# Patient Record
Sex: MALE | Race: WHITE | URBAN - METROPOLITAN AREA
[De-identification: names, ages, dates, MRNs, and addresses within clinical notes are randomized per-mention and may not be internally consistent; named-entity substitution may affect disease eponyms.]

---

## 2017-07-12 ENCOUNTER — APPOINTMENT (RX ONLY)
Dept: URBAN - METROPOLITAN AREA CLINIC 86 | Facility: CLINIC | Age: 57
Setting detail: DERMATOLOGY
End: 2017-07-12

## 2017-07-12 VITALS
WEIGHT: 181 LBS | DIASTOLIC BLOOD PRESSURE: 76 MMHG | SYSTOLIC BLOOD PRESSURE: 115 MMHG | HEART RATE: 74 BPM | HEIGHT: 68 IN

## 2017-07-12 DIAGNOSIS — D18.0 HEMANGIOMA: ICD-10-CM

## 2017-07-12 DIAGNOSIS — D22 MELANOCYTIC NEVI: ICD-10-CM

## 2017-07-12 DIAGNOSIS — Z71.89 OTHER SPECIFIED COUNSELING: ICD-10-CM

## 2017-07-12 PROBLEM — D22.9 MELANOCYTIC NEVI, UNSPECIFIED: Status: ACTIVE | Noted: 2017-07-12

## 2017-07-12 PROBLEM — D18.01 HEMANGIOMA OF SKIN AND SUBCUTANEOUS TISSUE: Status: ACTIVE | Noted: 2017-07-12

## 2017-07-12 PROCEDURE — ? COUNSELING

## 2017-07-12 PROCEDURE — 99214 OFFICE O/P EST MOD 30 MIN: CPT

## 2017-07-12 ASSESSMENT — LOCATION DETAILED DESCRIPTION DERM: LOCATION DETAILED: RIGHT VENTRAL PROXIMAL FOREARM

## 2017-07-12 ASSESSMENT — LOCATION SIMPLE DESCRIPTION DERM: LOCATION SIMPLE: RIGHT FOREARM

## 2017-07-12 ASSESSMENT — LOCATION ZONE DERM: LOCATION ZONE: ARM

## 2017-07-12 NOTE — PROCEDURE: MIPS QUALITY
Quality 134: Screening For Clinical Depression And Follow-Up Plan: The patient was screened for depression and the screen was negative and no follow up required
Quality 130: Documentation Of Current Medications In The Medical Record: Current Medications Documented
Quality 394c: Hpv Vaccine For Adolescents: Patient did not have at least three HPV vaccines on or between the patientâs 9th and 13th birthdays.
Quality 402: Tobacco Use And Help With Quitting Among Adolescents: Patient screened for tobacco and never smoked
Quality 154 Part A: Falls: Risk Assessment (Should Be Reported With Measure 155.): Falls risk assessment completed and documented in the past 12 months.
Quality 394b: Td/Tdap Immunizations For Adolescents: Patient did not have one Tdap or one Td vaccine on or between the patient's 10th and 13th birthdays.
Quality 128: Preventive Care And Screening: Body Mass Index (Bmi) Screening And Follow-Up Plan: BMI is documented above normal parameters and a follow-up plan is documented
Quality 111:Pneumonia Vaccination Status For Older Adults: Pneumococcal Vaccination not Administered or Previously Received, Reason not Otherwise Specified
Quality 154 Part B: Falls: Risk Screening (Should Be Reported With Measure 155.): No documentation of falls status
Quality 317: Preventative Care And Screening: Screening For High Blood Pressure And Follow-Up Documented: Pre-hypertensive or hypertensive blood pressure reading documented, and the indicated follow-up is documented
Quality 431: Preventive Care And Screening: Unhealthy Alcohol Use - Screening: Patient screened for unhealthy alcohol use using a single question and scores less than 2 times per year
Quality 394a: Meningococcal Immunizations For Adolescents: Patient did not have one dose of meningococcal vaccine on or between the patient's 11th and 13th birthdays.
Quality 47: Advance Care Plan: Advance Care Planning discussed and documented; advance care plan or surrogate decision maker documented in the medical record.
Quality 155: Falls Plan Of Care: Falls plan of care documented (including vitamin D supplementation)
Quality 110: Preventive Care And Screening: Influenza Immunization: Influenza Immunization Administered during Influenza season
Quality 131: Pain Assessment And Follow-Up: Pain assessment using a standardized tool is documented as negative, no follow-up plan required
Detail Level: Detailed

## 2020-09-21 ENCOUNTER — HOSPITAL ENCOUNTER (OUTPATIENT)
Dept: MRI IMAGING | Age: 60
Discharge: HOME | End: 2020-09-21
Attending: NEUROLOGICAL SURGERY
Payer: COMMERCIAL

## 2020-09-21 DIAGNOSIS — M51.26 HNP (HERNIATED NUCLEUS PULPOSUS), LUMBAR: ICD-10-CM

## 2020-09-21 PROCEDURE — 72148 MRI LUMBAR SPINE W/O DYE: CPT

## 2020-12-01 NOTE — PERIOP NOTES
SPOKE WITH PATIENT; HE WILL HAVE COVID TEST DONE AT [facility] ON 12/11/20 AND FAX RESULTS TO PAT & SURGERY DEPARTMENT. FAX NUMBERS PROVIDED. PT. UNDERSTANDS IT IS HIS RESPONSIBILITY TO GET RESULTS TO US BY 12/14/20 OR SURGERY WILL BE CANCELED. HE PLANS ON BRINGING HARD COPY OF RESULTS DOS. HE UNDERSTANDS THE NEED TO SELF QUARANTINE FROM TIME OF TEST UNTIL DOS AND WILL COMPLY.

## 2020-12-07 ENCOUNTER — HOSPITAL ENCOUNTER (OUTPATIENT)
Dept: PREADMISSION TESTING | Age: 60
Discharge: HOME | End: 2020-12-07
Payer: COMMERCIAL

## 2020-12-07 VITALS
WEIGHT: 176.81 LBS | HEIGHT: 69 IN | SYSTOLIC BLOOD PRESSURE: 127 MMHG | HEART RATE: 65 BPM | BODY MASS INDEX: 26.19 KG/M2 | RESPIRATION RATE: 18 BRPM | DIASTOLIC BLOOD PRESSURE: 80 MMHG | TEMPERATURE: 98.5 F

## 2020-12-07 LAB
BASOPHILS # BLD: 0 K/UL (ref 0–0.1)
BASOPHILS NFR BLD: 0 % (ref 0–1)
DIFFERENTIAL METHOD BLD: ABNORMAL
EOSINOPHIL # BLD: 0 K/UL (ref 0–0.4)
EOSINOPHIL NFR BLD: 0 % (ref 0–7)
ERYTHROCYTE [DISTWIDTH] IN BLOOD BY AUTOMATED COUNT: 12.9 % (ref 11.5–14.5)
HCT VFR BLD AUTO: 43.6 % (ref 36.6–50.3)
HGB BLD-MCNC: 14.6 G/DL (ref 12.1–17)
IMM GRANULOCYTES # BLD AUTO: 0.1 K/UL (ref 0–0.04)
IMM GRANULOCYTES NFR BLD AUTO: 1 % (ref 0–0.5)
LYMPHOCYTES # BLD: 1.4 K/UL (ref 0.8–3.5)
LYMPHOCYTES NFR BLD: 16 % (ref 12–49)
MCH RBC QN AUTO: 31.5 PG (ref 26–34)
MCHC RBC AUTO-ENTMCNC: 33.5 G/DL (ref 30–36.5)
MCV RBC AUTO: 94 FL (ref 80–99)
MONOCYTES # BLD: 1 K/UL (ref 0–1)
MONOCYTES NFR BLD: 11 % (ref 5–13)
NEUTS SEG # BLD: 6.3 K/UL (ref 1.8–8)
NEUTS SEG NFR BLD: 72 % (ref 32–75)
NRBC # BLD: 0 K/UL (ref 0–0.01)
NRBC BLD-RTO: 0 PER 100 WBC
PLATELET # BLD AUTO: 208 K/UL (ref 150–400)
PMV BLD AUTO: 11 FL (ref 8.9–12.9)
RBC # BLD AUTO: 4.64 M/UL (ref 4.1–5.7)
WBC # BLD AUTO: 8.8 K/UL (ref 4.1–11.1)

## 2020-12-07 PROCEDURE — 85025 COMPLETE CBC W/AUTO DIFF WBC: CPT

## 2020-12-07 PROCEDURE — 36415 COLL VENOUS BLD VENIPUNCTURE: CPT

## 2020-12-07 RX ORDER — GABAPENTIN 300 MG/1
300 CAPSULE ORAL 4 TIMES DAILY
Status: ON HOLD | COMMUNITY
End: 2020-12-16 | Stop reason: SDUPTHER

## 2020-12-07 NOTE — PERIOP NOTES
PRE-OPERATIVE INSTRUCTIONS REVIEWED WITH PATIENT. PT GIVEN TIME TO ASK QUESTIONS   PATIENT GIVEN 2-BOTTLE OF CHG SOAP. REVIEWED   PATIENT GIVEN SSI INFECTION FAQ SHEET.   MRSA / MSSA TREATMENT INSTRUCTION SHEET GIVEN      INSTRUCTIONS GIVEN FOR NEW ADMISSION PROCESS    PT STATES IS [address], UNDERSTANDS THAT RESULTS MUST BE FAXED TO OUR OFFICE ALSO TO SURGERY AND WAS INSTRUCTED TO MAKE SURE HE BRINGS HARD COPY TO [address] [place] [address] OF SURGERY

## 2020-12-08 LAB
BACTERIA SPEC CULT: NORMAL
BACTERIA SPEC CULT: NORMAL
SERVICE CMNT-IMP: NORMAL

## 2020-12-15 ENCOUNTER — APPOINTMENT (OUTPATIENT)
Dept: GENERAL RADIOLOGY | Age: 60
End: 2020-12-15
Attending: NEUROLOGICAL SURGERY
Payer: COMMERCIAL

## 2020-12-15 ENCOUNTER — ANESTHESIA (OUTPATIENT)
Dept: SURGERY | Age: 60
End: 2020-12-15
Payer: COMMERCIAL

## 2020-12-15 ENCOUNTER — ANESTHESIA EVENT (OUTPATIENT)
Dept: SURGERY | Age: 60
End: 2020-12-15
Payer: COMMERCIAL

## 2020-12-15 ENCOUNTER — HOSPITAL ENCOUNTER (OUTPATIENT)
Age: 60
Discharge: HOME | End: 2020-12-16
Attending: NEUROLOGICAL SURGERY | Admitting: NEUROLOGICAL SURGERY
Payer: COMMERCIAL

## 2020-12-15 DIAGNOSIS — Z98.1 S/P LUMBAR SPINAL FUSION: Primary | ICD-10-CM

## 2020-12-15 DIAGNOSIS — M48.062 LUMBAR STENOSIS WITH NEUROGENIC CLAUDICATION: ICD-10-CM

## 2020-12-15 PROCEDURE — 2709999900 HC NON-CHARGEABLE SUPPLY: Performed by: NEUROLOGICAL SURGERY

## 2020-12-15 PROCEDURE — 77030012035 HC APPL SEAL MICROMYST KT INLC -C: Performed by: NEUROLOGICAL SURGERY

## 2020-12-15 PROCEDURE — 76010000172 HC OR TIME 2.5 TO 3 HR INTENSV-TIER 1: Performed by: NEUROLOGICAL SURGERY

## 2020-12-15 PROCEDURE — 99218 HC RM OBSERVATION: CPT

## 2020-12-15 PROCEDURE — 76060000036 HC ANESTHESIA 2.5 TO 3 HR: Performed by: NEUROLOGICAL SURGERY

## 2020-12-15 PROCEDURE — 74011250637 HC RX REV CODE- 250/637: Performed by: NEUROLOGICAL SURGERY

## 2020-12-15 PROCEDURE — 74011250637 HC RX REV CODE- 250/637: Performed by: ANESTHESIOLOGY

## 2020-12-15 PROCEDURE — 76210000017 HC OR PH I REC 1.5 TO 2 HR: Performed by: NEUROLOGICAL SURGERY

## 2020-12-15 PROCEDURE — 74011250636 HC RX REV CODE- 250/636: Performed by: NURSE ANESTHETIST, CERTIFIED REGISTERED

## 2020-12-15 PROCEDURE — 77030029099 HC BN WAX SSPC -A: Performed by: NEUROLOGICAL SURGERY

## 2020-12-15 PROCEDURE — 77030008684 HC TU ET CUF COVD -B: Performed by: NURSE ANESTHETIST, CERTIFIED REGISTERED

## 2020-12-15 PROCEDURE — 77030013951 HC SEAL TISS ADH DURASL KT INLC -G1: Performed by: NEUROLOGICAL SURGERY

## 2020-12-15 PROCEDURE — 74011250636 HC RX REV CODE- 250/636: Performed by: NEUROLOGICAL SURGERY

## 2020-12-15 PROCEDURE — 77030038600 HC TU BPLR IRR DISP STRY -B: Performed by: NEUROLOGICAL SURGERY

## 2020-12-15 PROCEDURE — 77030014007 HC SPNG HEMSTAT J&J -B: Performed by: NEUROLOGICAL SURGERY

## 2020-12-15 PROCEDURE — 77030026438 HC STYL ET INTUB CARD -A: Performed by: NURSE ANESTHETIST, CERTIFIED REGISTERED

## 2020-12-15 PROCEDURE — 77030040922 HC BLNKT HYPOTHRM STRY -A

## 2020-12-15 PROCEDURE — 77030003029 HC SUT VCRL J&J -B: Performed by: NEUROLOGICAL SURGERY

## 2020-12-15 PROCEDURE — 77030004391 HC BUR FLUT MEDT -C: Performed by: NEUROLOGICAL SURGERY

## 2020-12-15 PROCEDURE — 74011000250 HC RX REV CODE- 250: Performed by: NEUROLOGICAL SURGERY

## 2020-12-15 PROCEDURE — 77030014647 HC SEAL FBRN TISSL BAXT -D: Performed by: NEUROLOGICAL SURGERY

## 2020-12-15 PROCEDURE — 74011250636 HC RX REV CODE- 250/636: Performed by: ANESTHESIOLOGY

## 2020-12-15 PROCEDURE — 74011000250 HC RX REV CODE- 250: Performed by: NURSE ANESTHETIST, CERTIFIED REGISTERED

## 2020-12-15 RX ORDER — ACETAMINOPHEN 325 MG/1
650 TABLET ORAL ONCE
Status: DISCONTINUED | OUTPATIENT
Start: 2020-12-15 | End: 2020-12-15

## 2020-12-15 RX ORDER — OXYCODONE HYDROCHLORIDE 5 MG/1
5 TABLET ORAL
Status: DISCONTINUED | OUTPATIENT
Start: 2020-12-15 | End: 2020-12-16 | Stop reason: HOSPADM

## 2020-12-15 RX ORDER — SODIUM CHLORIDE 9 MG/ML
25 INJECTION, SOLUTION INTRAVENOUS CONTINUOUS
Status: DISCONTINUED | OUTPATIENT
Start: 2020-12-15 | End: 2020-12-15 | Stop reason: HOSPADM

## 2020-12-15 RX ORDER — DIPHENHYDRAMINE HYDROCHLORIDE 50 MG/ML
12.5 INJECTION, SOLUTION INTRAMUSCULAR; INTRAVENOUS AS NEEDED
Status: DISCONTINUED | OUTPATIENT
Start: 2020-12-15 | End: 2020-12-15 | Stop reason: HOSPADM

## 2020-12-15 RX ORDER — DEXAMETHASONE SODIUM PHOSPHATE 4 MG/ML
INJECTION, SOLUTION INTRA-ARTICULAR; INTRALESIONAL; INTRAMUSCULAR; INTRAVENOUS; SOFT TISSUE AS NEEDED
Status: DISCONTINUED | OUTPATIENT
Start: 2020-12-15 | End: 2020-12-15 | Stop reason: HOSPADM

## 2020-12-15 RX ORDER — LIDOCAINE HYDROCHLORIDE 20 MG/ML
INJECTION, SOLUTION EPIDURAL; INFILTRATION; INTRACAUDAL; PERINEURAL AS NEEDED
Status: DISCONTINUED | OUTPATIENT
Start: 2020-12-15 | End: 2020-12-15 | Stop reason: HOSPADM

## 2020-12-15 RX ORDER — CYCLOBENZAPRINE HCL 10 MG
10 TABLET ORAL
Status: DISCONTINUED | OUTPATIENT
Start: 2020-12-15 | End: 2020-12-16 | Stop reason: HOSPADM

## 2020-12-15 RX ORDER — BUPIVACAINE HYDROCHLORIDE 5 MG/ML
INJECTION, SOLUTION EPIDURAL; INTRACAUDAL AS NEEDED
Status: DISCONTINUED | OUTPATIENT
Start: 2020-12-15 | End: 2020-12-15 | Stop reason: HOSPADM

## 2020-12-15 RX ORDER — OXYCODONE HYDROCHLORIDE 5 MG/1
10 TABLET ORAL
Status: DISCONTINUED | OUTPATIENT
Start: 2020-12-15 | End: 2020-12-16 | Stop reason: HOSPADM

## 2020-12-15 RX ORDER — FENTANYL CITRATE 50 UG/ML
50 INJECTION, SOLUTION INTRAMUSCULAR; INTRAVENOUS AS NEEDED
Status: DISCONTINUED | OUTPATIENT
Start: 2020-12-15 | End: 2020-12-15 | Stop reason: HOSPADM

## 2020-12-15 RX ORDER — DOCUSATE SODIUM 100 MG/1
100 CAPSULE, LIQUID FILLED ORAL 2 TIMES DAILY
Status: DISCONTINUED | OUTPATIENT
Start: 2020-12-15 | End: 2020-12-16 | Stop reason: HOSPADM

## 2020-12-15 RX ORDER — SODIUM CHLORIDE 0.9 % (FLUSH) 0.9 %
5-40 SYRINGE (ML) INJECTION AS NEEDED
Status: DISCONTINUED | OUTPATIENT
Start: 2020-12-15 | End: 2020-12-15 | Stop reason: HOSPADM

## 2020-12-15 RX ORDER — SODIUM CHLORIDE, SODIUM LACTATE, POTASSIUM CHLORIDE, CALCIUM CHLORIDE 600; 310; 30; 20 MG/100ML; MG/100ML; MG/100ML; MG/100ML
125 INJECTION, SOLUTION INTRAVENOUS CONTINUOUS
Status: DISCONTINUED | OUTPATIENT
Start: 2020-12-15 | End: 2020-12-15 | Stop reason: HOSPADM

## 2020-12-15 RX ORDER — MIDAZOLAM HYDROCHLORIDE 1 MG/ML
0.5 INJECTION, SOLUTION INTRAMUSCULAR; INTRAVENOUS
Status: DISCONTINUED | OUTPATIENT
Start: 2020-12-15 | End: 2020-12-15 | Stop reason: HOSPADM

## 2020-12-15 RX ORDER — SODIUM CHLORIDE 0.9 % (FLUSH) 0.9 %
5-40 SYRINGE (ML) INJECTION EVERY 8 HOURS
Status: DISCONTINUED | OUTPATIENT
Start: 2020-12-15 | End: 2020-12-15 | Stop reason: HOSPADM

## 2020-12-15 RX ORDER — MIDAZOLAM HYDROCHLORIDE 1 MG/ML
1 INJECTION, SOLUTION INTRAMUSCULAR; INTRAVENOUS AS NEEDED
Status: DISCONTINUED | OUTPATIENT
Start: 2020-12-15 | End: 2020-12-15 | Stop reason: HOSPADM

## 2020-12-15 RX ORDER — SODIUM CHLORIDE 9 MG/ML
125 INJECTION, SOLUTION INTRAVENOUS CONTINUOUS
Status: DISCONTINUED | OUTPATIENT
Start: 2020-12-15 | End: 2020-12-16

## 2020-12-15 RX ORDER — HYDROMORPHONE HYDROCHLORIDE 1 MG/ML
0.2 INJECTION, SOLUTION INTRAMUSCULAR; INTRAVENOUS; SUBCUTANEOUS
Status: DISCONTINUED | OUTPATIENT
Start: 2020-12-15 | End: 2020-12-15 | Stop reason: HOSPADM

## 2020-12-15 RX ORDER — HYDROMORPHONE HYDROCHLORIDE 2 MG/ML
INJECTION, SOLUTION INTRAMUSCULAR; INTRAVENOUS; SUBCUTANEOUS AS NEEDED
Status: DISCONTINUED | OUTPATIENT
Start: 2020-12-15 | End: 2020-12-15 | Stop reason: HOSPADM

## 2020-12-15 RX ORDER — GABAPENTIN 300 MG/1
300 CAPSULE ORAL 4 TIMES DAILY
Status: DISCONTINUED | OUTPATIENT
Start: 2020-12-15 | End: 2020-12-16 | Stop reason: HOSPADM

## 2020-12-15 RX ORDER — SODIUM CHLORIDE 0.9 % (FLUSH) 0.9 %
5-40 SYRINGE (ML) INJECTION EVERY 8 HOURS
Status: DISCONTINUED | OUTPATIENT
Start: 2020-12-15 | End: 2020-12-16 | Stop reason: HOSPADM

## 2020-12-15 RX ORDER — TRAMADOL HYDROCHLORIDE 50 MG/1
50 TABLET ORAL
Status: DISCONTINUED | OUTPATIENT
Start: 2020-12-15 | End: 2020-12-16 | Stop reason: HOSPADM

## 2020-12-15 RX ORDER — ACETAMINOPHEN 325 MG/1
650 TABLET ORAL EVERY 6 HOURS
Status: DISCONTINUED | OUTPATIENT
Start: 2020-12-15 | End: 2020-12-16 | Stop reason: HOSPADM

## 2020-12-15 RX ORDER — ONDANSETRON 2 MG/ML
4 INJECTION INTRAMUSCULAR; INTRAVENOUS AS NEEDED
Status: DISCONTINUED | OUTPATIENT
Start: 2020-12-15 | End: 2020-12-15 | Stop reason: HOSPADM

## 2020-12-15 RX ORDER — MORPHINE SULFATE 10 MG/ML
2 INJECTION, SOLUTION INTRAMUSCULAR; INTRAVENOUS
Status: DISCONTINUED | OUTPATIENT
Start: 2020-12-15 | End: 2020-12-15 | Stop reason: HOSPADM

## 2020-12-15 RX ORDER — PROPOFOL 10 MG/ML
INJECTION, EMULSION INTRAVENOUS AS NEEDED
Status: DISCONTINUED | OUTPATIENT
Start: 2020-12-15 | End: 2020-12-15 | Stop reason: HOSPADM

## 2020-12-15 RX ORDER — LORATADINE 10 MG/1
10 TABLET ORAL DAILY
Status: DISCONTINUED | OUTPATIENT
Start: 2020-12-16 | End: 2020-12-16 | Stop reason: HOSPADM

## 2020-12-15 RX ORDER — ONDANSETRON 2 MG/ML
INJECTION INTRAMUSCULAR; INTRAVENOUS AS NEEDED
Status: DISCONTINUED | OUTPATIENT
Start: 2020-12-15 | End: 2020-12-15 | Stop reason: HOSPADM

## 2020-12-15 RX ORDER — MIDAZOLAM HYDROCHLORIDE 1 MG/ML
INJECTION, SOLUTION INTRAMUSCULAR; INTRAVENOUS AS NEEDED
Status: DISCONTINUED | OUTPATIENT
Start: 2020-12-15 | End: 2020-12-15 | Stop reason: HOSPADM

## 2020-12-15 RX ORDER — DIPHENHYDRAMINE HCL 25 MG
25 CAPSULE ORAL
Status: DISCONTINUED | OUTPATIENT
Start: 2020-12-15 | End: 2020-12-16 | Stop reason: HOSPADM

## 2020-12-15 RX ORDER — ROCURONIUM BROMIDE 10 MG/ML
INJECTION, SOLUTION INTRAVENOUS AS NEEDED
Status: DISCONTINUED | OUTPATIENT
Start: 2020-12-15 | End: 2020-12-15 | Stop reason: HOSPADM

## 2020-12-15 RX ORDER — ONDANSETRON 4 MG/1
4 TABLET, ORALLY DISINTEGRATING ORAL
Status: DISCONTINUED | OUTPATIENT
Start: 2020-12-15 | End: 2020-12-16 | Stop reason: HOSPADM

## 2020-12-15 RX ORDER — ACETAMINOPHEN 500 MG
1000 TABLET ORAL ONCE
Status: COMPLETED | OUTPATIENT
Start: 2020-12-15 | End: 2020-12-15

## 2020-12-15 RX ORDER — KETAMINE HYDROCHLORIDE 10 MG/ML
INJECTION, SOLUTION INTRAMUSCULAR; INTRAVENOUS AS NEEDED
Status: DISCONTINUED | OUTPATIENT
Start: 2020-12-15 | End: 2020-12-15 | Stop reason: HOSPADM

## 2020-12-15 RX ORDER — PHENYLEPHRINE HCL IN 0.9% NACL 0.4MG/10ML
SYRINGE (ML) INTRAVENOUS AS NEEDED
Status: DISCONTINUED | OUTPATIENT
Start: 2020-12-15 | End: 2020-12-15 | Stop reason: HOSPADM

## 2020-12-15 RX ORDER — HYDROMORPHONE HYDROCHLORIDE 1 MG/ML
0.5 INJECTION, SOLUTION INTRAMUSCULAR; INTRAVENOUS; SUBCUTANEOUS
Status: DISCONTINUED | OUTPATIENT
Start: 2020-12-15 | End: 2020-12-16 | Stop reason: HOSPADM

## 2020-12-15 RX ORDER — MELATONIN
1000 DAILY
Status: DISCONTINUED | OUTPATIENT
Start: 2020-12-16 | End: 2020-12-16 | Stop reason: HOSPADM

## 2020-12-15 RX ORDER — NALOXONE HYDROCHLORIDE 0.4 MG/ML
0.4 INJECTION, SOLUTION INTRAMUSCULAR; INTRAVENOUS; SUBCUTANEOUS AS NEEDED
Status: DISCONTINUED | OUTPATIENT
Start: 2020-12-15 | End: 2020-12-16 | Stop reason: HOSPADM

## 2020-12-15 RX ORDER — SODIUM CHLORIDE, SODIUM LACTATE, POTASSIUM CHLORIDE, CALCIUM CHLORIDE 600; 310; 30; 20 MG/100ML; MG/100ML; MG/100ML; MG/100ML
75 INJECTION, SOLUTION INTRAVENOUS CONTINUOUS
Status: DISCONTINUED | OUTPATIENT
Start: 2020-12-15 | End: 2020-12-15 | Stop reason: HOSPADM

## 2020-12-15 RX ORDER — ROPIVACAINE HYDROCHLORIDE 5 MG/ML
30 INJECTION, SOLUTION EPIDURAL; INFILTRATION; PERINEURAL ONCE
Status: DISCONTINUED | OUTPATIENT
Start: 2020-12-15 | End: 2020-12-15 | Stop reason: HOSPADM

## 2020-12-15 RX ORDER — FENTANYL CITRATE 50 UG/ML
25 INJECTION, SOLUTION INTRAMUSCULAR; INTRAVENOUS
Status: COMPLETED | OUTPATIENT
Start: 2020-12-15 | End: 2020-12-15

## 2020-12-15 RX ORDER — SUCCINYLCHOLINE CHLORIDE 20 MG/ML
INJECTION INTRAMUSCULAR; INTRAVENOUS AS NEEDED
Status: DISCONTINUED | OUTPATIENT
Start: 2020-12-15 | End: 2020-12-15 | Stop reason: HOSPADM

## 2020-12-15 RX ORDER — FENTANYL CITRATE 50 UG/ML
INJECTION, SOLUTION INTRAMUSCULAR; INTRAVENOUS AS NEEDED
Status: DISCONTINUED | OUTPATIENT
Start: 2020-12-15 | End: 2020-12-15 | Stop reason: HOSPADM

## 2020-12-15 RX ORDER — SODIUM CHLORIDE, SODIUM LACTATE, POTASSIUM CHLORIDE, CALCIUM CHLORIDE 600; 310; 30; 20 MG/100ML; MG/100ML; MG/100ML; MG/100ML
INJECTION, SOLUTION INTRAVENOUS
Status: DISCONTINUED | OUTPATIENT
Start: 2020-12-15 | End: 2020-12-15 | Stop reason: HOSPADM

## 2020-12-15 RX ORDER — DEXAMETHASONE SODIUM PHOSPHATE 4 MG/ML
4 INJECTION, SOLUTION INTRA-ARTICULAR; INTRALESIONAL; INTRAMUSCULAR; INTRAVENOUS; SOFT TISSUE EVERY 8 HOURS
Status: COMPLETED | OUTPATIENT
Start: 2020-12-15 | End: 2020-12-16

## 2020-12-15 RX ORDER — LIDOCAINE HYDROCHLORIDE 10 MG/ML
0.1 INJECTION, SOLUTION EPIDURAL; INFILTRATION; INTRACAUDAL; PERINEURAL AS NEEDED
Status: DISCONTINUED | OUTPATIENT
Start: 2020-12-15 | End: 2020-12-15 | Stop reason: HOSPADM

## 2020-12-15 RX ORDER — SODIUM CHLORIDE 0.9 % (FLUSH) 0.9 %
5-40 SYRINGE (ML) INJECTION AS NEEDED
Status: DISCONTINUED | OUTPATIENT
Start: 2020-12-15 | End: 2020-12-16 | Stop reason: HOSPADM

## 2020-12-15 RX ADMIN — MORPHINE SULFATE 2 MG: 10 INJECTION INTRAVENOUS at 15:22

## 2020-12-15 RX ADMIN — FENTANYL CITRATE 25 MCG: 50 INJECTION, SOLUTION INTRAMUSCULAR; INTRAVENOUS at 14:45

## 2020-12-15 RX ADMIN — PROPOFOL 200 MG: 10 INJECTION, EMULSION INTRAVENOUS at 11:02

## 2020-12-15 RX ADMIN — OXYCODONE 10 MG: 5 TABLET ORAL at 16:15

## 2020-12-15 RX ADMIN — SODIUM CHLORIDE, POTASSIUM CHLORIDE, SODIUM LACTATE AND CALCIUM CHLORIDE: 600; 310; 30; 20 INJECTION, SOLUTION INTRAVENOUS at 10:51

## 2020-12-15 RX ADMIN — MIDAZOLAM 0.5 MG: 1 INJECTION INTRAMUSCULAR; INTRAVENOUS at 14:24

## 2020-12-15 RX ADMIN — CEFAZOLIN SODIUM 2 G: 1 INJECTION, POWDER, FOR SOLUTION INTRAMUSCULAR; INTRAVENOUS at 19:04

## 2020-12-15 RX ADMIN — SODIUM CHLORIDE, POTASSIUM CHLORIDE, SODIUM LACTATE AND CALCIUM CHLORIDE 125 ML/HR: 600; 310; 30; 20 INJECTION, SOLUTION INTRAVENOUS at 09:58

## 2020-12-15 RX ADMIN — ROCURONIUM BROMIDE 5 MG: 10 SOLUTION INTRAVENOUS at 11:02

## 2020-12-15 RX ADMIN — MORPHINE SULFATE 2 MG: 10 INJECTION INTRAVENOUS at 15:11

## 2020-12-15 RX ADMIN — ACETAMINOPHEN 1000 MG: 500 TABLET ORAL at 09:51

## 2020-12-15 RX ADMIN — MIDAZOLAM 0.5 MG: 1 INJECTION INTRAMUSCULAR; INTRAVENOUS at 14:15

## 2020-12-15 RX ADMIN — FENTANYL CITRATE 50 MCG: 50 INJECTION, SOLUTION INTRAMUSCULAR; INTRAVENOUS at 11:02

## 2020-12-15 RX ADMIN — ROCURONIUM BROMIDE 10 MG: 10 SOLUTION INTRAVENOUS at 11:21

## 2020-12-15 RX ADMIN — MORPHINE SULFATE 2 MG: 10 INJECTION INTRAVENOUS at 15:00

## 2020-12-15 RX ADMIN — SODIUM CHLORIDE 125 ML/HR: 900 INJECTION, SOLUTION INTRAVENOUS at 14:39

## 2020-12-15 RX ADMIN — ROCURONIUM BROMIDE 10 MG: 10 SOLUTION INTRAVENOUS at 12:16

## 2020-12-15 RX ADMIN — ROCURONIUM BROMIDE 45 MG: 10 SOLUTION INTRAVENOUS at 11:14

## 2020-12-15 RX ADMIN — MIDAZOLAM 0.5 MG: 1 INJECTION INTRAMUSCULAR; INTRAVENOUS at 14:02

## 2020-12-15 RX ADMIN — OXYCODONE 10 MG: 5 TABLET ORAL at 19:14

## 2020-12-15 RX ADMIN — LIDOCAINE HYDROCHLORIDE 80 MG: 20 INJECTION, SOLUTION EPIDURAL; INFILTRATION; INTRACAUDAL; PERINEURAL at 11:02

## 2020-12-15 RX ADMIN — FENTANYL CITRATE 25 MCG: 50 INJECTION, SOLUTION INTRAMUSCULAR; INTRAVENOUS at 14:23

## 2020-12-15 RX ADMIN — ACETAMINOPHEN 650 MG: 325 TABLET ORAL at 18:03

## 2020-12-15 RX ADMIN — KETAMINE HYDROCHLORIDE 30 MG: 10 INJECTION, SOLUTION INTRAMUSCULAR; INTRAVENOUS at 11:12

## 2020-12-15 RX ADMIN — FENTANYL CITRATE 25 MCG: 50 INJECTION, SOLUTION INTRAMUSCULAR; INTRAVENOUS at 14:14

## 2020-12-15 RX ADMIN — ROCURONIUM BROMIDE 30 MG: 10 SOLUTION INTRAVENOUS at 12:05

## 2020-12-15 RX ADMIN — WATER 2 G: 1 INJECTION INTRAMUSCULAR; INTRAVENOUS; SUBCUTANEOUS at 11:10

## 2020-12-15 RX ADMIN — Medication 40 MCG: at 12:31

## 2020-12-15 RX ADMIN — HYDROMORPHONE HYDROCHLORIDE 0.5 MG: 2 INJECTION, SOLUTION INTRAMUSCULAR; INTRAVENOUS; SUBCUTANEOUS at 11:21

## 2020-12-15 RX ADMIN — ROCURONIUM BROMIDE 10 MG: 10 SOLUTION INTRAVENOUS at 11:30

## 2020-12-15 RX ADMIN — ONDANSETRON HYDROCHLORIDE 4 MG: 2 INJECTION, SOLUTION INTRAMUSCULAR; INTRAVENOUS at 11:15

## 2020-12-15 RX ADMIN — SUCCINYLCHOLINE CHLORIDE 160 MG: 20 INJECTION, SOLUTION INTRAMUSCULAR; INTRAVENOUS at 11:02

## 2020-12-15 RX ADMIN — PROPOFOL 100 MG: 10 INJECTION, EMULSION INTRAVENOUS at 11:20

## 2020-12-15 RX ADMIN — DOCUSATE SODIUM 100 MG: 100 CAPSULE ORAL at 18:03

## 2020-12-15 RX ADMIN — DEXAMETHASONE SODIUM PHOSPHATE 8 MG: 4 INJECTION, SOLUTION INTRAMUSCULAR; INTRAVENOUS at 11:15

## 2020-12-15 RX ADMIN — GABAPENTIN 300 MG: 300 CAPSULE ORAL at 18:03

## 2020-12-15 RX ADMIN — DEXAMETHASONE SODIUM PHOSPHATE 4 MG: 4 INJECTION, SOLUTION INTRAMUSCULAR; INTRAVENOUS at 19:03

## 2020-12-15 RX ADMIN — FENTANYL CITRATE 25 MCG: 50 INJECTION, SOLUTION INTRAMUSCULAR; INTRAVENOUS at 14:02

## 2020-12-15 RX ADMIN — FENTANYL CITRATE 50 MCG: 50 INJECTION, SOLUTION INTRAMUSCULAR; INTRAVENOUS at 11:12

## 2020-12-15 RX ADMIN — MIDAZOLAM 2 MG: 1 INJECTION INTRAMUSCULAR; INTRAVENOUS at 10:56

## 2020-12-15 RX ADMIN — OXYCODONE 10 MG: 5 TABLET ORAL at 22:33

## 2020-12-15 RX ADMIN — GABAPENTIN 300 MG: 300 CAPSULE ORAL at 22:33

## 2020-12-15 NOTE — ROUTINE PROCESS
Patient: Brigido Laser MRN: [id number]  SSN: [id number]   YOB: 1960  Age: 63 y.o. Sex: male     Patient is status post Procedure(s):  L2-L5 LAMINECTOMY. Surgeon(s) and Role:     Ruth Esparza MD - Primary    Local/Dose/Irrigation: 20 mL 0.5% marcaine plain                   Peripheral IV 12/15/20 Anterior;Left;Proximal Forearm (Active)   Site Assessment Clean, dry, & intact 12/15/20 0957   Phlebitis Assessment 0 12/15/20 0957   Infiltration Assessment 0 12/15/20 0957   Dressing Status Clean, dry, & intact; New 12/15/20 0957   Dressing Type Tape;Transparent 12/15/20 0957   Hub Color/Line Status Green; Infusing 12/15/20 0957          Hemovac (Active)      Airway - Endotracheal Tube 12/15/20 Oral (Active)                   Dressing/Packing:  Wound Back-Dressing/Treatment: Liquid /adhesive;Steri-strips;Gauze dressing/dressing sponge (12/15/20 1307)    Splint/Cast:  ]

## 2020-12-15 NOTE — PERIOP NOTES
TRANSFER - OUT REPORT:    Verbal report given to Kianna MARQUEZ) on Guilherme Ruelas  being transferred to room 540 for routine post - op       Report consisted of patients Situation, Background, Assessment and   Recommendations(SBAR). Time Pre op antibiotic given:2 gram ancef  Anesthesia Stop time: 5966  Pedroza Present on Transfer to floor:  Order for Pedroza on Chart:  Discharge Prescriptions with Chart:    Information from the following report(s) SBAR, OR Summary, Intake/Output and MAR was reviewed with the receiving nurse. Opportunity for questions and clarification was provided. Is the patient on 02? YES       L/Min 2       Other     Is the patient on a monitor? NO    Is the nurse transporting with the patient? NO    Surgical Waiting Area notified of patient's transfer from PACU? YES      The following personal items collected during your admission accompanied patient upon transfer:   Dental Appliance:    Vision:    Hearing Aid:    Jewelry: Jewelry: None  Clothing: Clothing: (clothing to PACU)  Other Valuables:  Other Valuables: Eyeglasses, Cell Phone, Nuala Canon  Valuables sent to safe: Personal Items Sent to Safe: #[id number] cellphone, insurance card, drivers license

## 2020-12-15 NOTE — BRIEF OP NOTE
Brief Postoperative Note    Patient: Baltazar Wynne  YOB: 1960  MRN: [id number]    Date of Procedure: 12/15/2020     Pre-Op Diagnosis: LUMBAR STENOSIS    Post-Op Diagnosis: Same as preoperative diagnosis.       Procedure(s):  L2-L5 LAMINECTOMY    Surgeon(s):  Pippa Amato MD    Surgical Assistant: Surg Asst-1: Chinmay Marie    Anesthesia: General     Estimated Blood Loss (mL): less than 953     Complications: None    Specimens: * No specimens in log *     Implants: * No implants in log *    Drains:   Hemovac (Active)       Findings: stenosis    Electronically Signed by Jacquelyn Guo MD on 12/15/2020 at 1:04 PM

## 2020-12-15 NOTE — ANESTHESIA POSTPROCEDURE EVALUATION
Post-Anesthesia Evaluation and Assessment    Patient: Vikram Leger MRN: [id number]  SSN: [id number]    YOB: 1960  Age: 63 y.o. Sex: male      I have evaluated the patient and they are stable and ready for discharge from the PACU. Cardiovascular Function/Vital Signs  Visit Vitals  BP (!) 133/93   Pulse 78   Temp 36.8 °C (98.3 °F)   Resp 12   Ht 5' 8.5\" (1.74 m)   Wt 80.2 kg (176 lb 12.9 oz)   SpO2 97%   BMI 26.49 kg/m²       Patient is status post General anesthesia for Procedure(s):  L2-L5 LAMINECTOMY. Nausea/Vomiting: None    Postoperative hydration reviewed and adequate. Pain:  Pain Scale 1: Numeric (0 - 10) (12/15/20 1424)  Pain Intensity 1: 7 (12/15/20 1424)   Managed    Neurological Status:   Neuro (WDL): Exceptions to WDL(sedate from anesthesia) (12/15/20 1345)   At baseline    Mental Status, Level of Consciousness: Alert and  oriented to person, place, and time    Pulmonary Status:   O2 Device: CO2 nasal cannula;Nasal airway (12/15/20 1345)   Adequate oxygenation and airway patent    Complications related to anesthesia: None    Post-anesthesia assessment completed. No concerns    Signed By: Candida Baumgarten, MD     December 15, 2020              Procedure(s):  L2-L5 LAMINECTOMY. general    <BSHSIANPOST>    INITIAL Post-op Vital signs:   Vitals Value Taken Time   /93 12/15/2020  2:30 PM   Temp 36.8 °C (98.3 °F) 12/15/2020  1:45 PM   Pulse 76 12/15/2020  2:39 PM   Resp 10 12/15/2020  2:39 PM   SpO2 93 % 12/15/2020  2:39 PM   Vitals shown include unvalidated device data.

## 2020-12-15 NOTE — PROGRESS NOTES
Primary Nurse Patricia Fisher and Flores Camejo RN performed a dual skin assessment on this patient Impairment noted- see wound doc flow sheet  Delgado score is 21

## 2020-12-16 VITALS
TEMPERATURE: 98.8 F | WEIGHT: 176.81 LBS | BODY MASS INDEX: 26.19 KG/M2 | OXYGEN SATURATION: 93 % | RESPIRATION RATE: 16 BRPM | HEIGHT: 69 IN | HEART RATE: 88 BPM | DIASTOLIC BLOOD PRESSURE: 86 MMHG | SYSTOLIC BLOOD PRESSURE: 132 MMHG

## 2020-12-16 PROCEDURE — 99218 HC RM OBSERVATION: CPT

## 2020-12-16 PROCEDURE — 97116 GAIT TRAINING THERAPY: CPT

## 2020-12-16 PROCEDURE — 97165 OT EVAL LOW COMPLEX 30 MIN: CPT

## 2020-12-16 PROCEDURE — 97161 PT EVAL LOW COMPLEX 20 MIN: CPT

## 2020-12-16 PROCEDURE — 74011000250 HC RX REV CODE- 250: Performed by: NEUROLOGICAL SURGERY

## 2020-12-16 PROCEDURE — 74011250637 HC RX REV CODE- 250/637: Performed by: NEUROLOGICAL SURGERY

## 2020-12-16 PROCEDURE — 74011250636 HC RX REV CODE- 250/636: Performed by: NEUROLOGICAL SURGERY

## 2020-12-16 RX ORDER — GABAPENTIN 300 MG/1
300 CAPSULE ORAL 4 TIMES DAILY
Qty: 56 CAP | Refills: 0 | Status: SHIPPED | OUTPATIENT
Start: 2020-12-16 | End: 2022-08-21

## 2020-12-16 RX ORDER — OXYCODONE HYDROCHLORIDE 5 MG/1
5 TABLET ORAL
Qty: 50 TAB | Refills: 0 | Status: SHIPPED | OUTPATIENT
Start: 2020-12-16 | End: 2020-12-30

## 2020-12-16 RX ORDER — TAMSULOSIN HYDROCHLORIDE 0.4 MG/1
0.4 CAPSULE ORAL DAILY
Status: DISCONTINUED | OUTPATIENT
Start: 2020-12-16 | End: 2020-12-16

## 2020-12-16 RX ADMIN — Medication 1 TABLET: at 08:58

## 2020-12-16 RX ADMIN — ACETAMINOPHEN 650 MG: 325 TABLET ORAL at 02:40

## 2020-12-16 RX ADMIN — OXYCODONE 10 MG: 5 TABLET ORAL at 05:40

## 2020-12-16 RX ADMIN — DEXAMETHASONE SODIUM PHOSPHATE 4 MG: 4 INJECTION, SOLUTION INTRAMUSCULAR; INTRAVENOUS at 05:40

## 2020-12-16 RX ADMIN — DOCUSATE SODIUM 100 MG: 100 CAPSULE ORAL at 08:58

## 2020-12-16 RX ADMIN — GABAPENTIN 300 MG: 300 CAPSULE ORAL at 08:57

## 2020-12-16 RX ADMIN — Medication 10 ML: at 08:20

## 2020-12-16 RX ADMIN — LORATADINE 10 MG: 10 TABLET ORAL at 08:58

## 2020-12-16 RX ADMIN — OXYCODONE 10 MG: 5 TABLET ORAL at 02:20

## 2020-12-16 RX ADMIN — CEFAZOLIN SODIUM 2 G: 1 INJECTION, POWDER, FOR SOLUTION INTRAMUSCULAR; INTRAVENOUS at 02:30

## 2020-12-16 RX ADMIN — OXYCODONE 10 MG: 5 TABLET ORAL at 08:58

## 2020-12-16 NOTE — PROGRESS NOTES
OCCUPATIONAL THERAPY EVALUATION/DISCHARGE  Patient: Bonnie Burrell (44 y.o. male)  Date: 12/16/2020  Primary Diagnosis: Lumbar stenosis with neurogenic claudication [M48.062]  Procedure(s) (LRB):  L2-L5 LAMINECTOMY (N/A) 1 Day Post-Op   Precautions:       ASSESSMENT  Based on the objective data described below, the patient presents with good ADL performance and mobility following admission. Pt is POD 1 s/p L2-L5 laminectomy. He is received sitting up in chair following PT session, reporting he has been able to complete ADLs without assistance. Pt is able to demos tailor sit to manage LB dressing tasks and does not need LH AE. He is receptive to education regarding ADL adaptations and home safety techniques in prep for transition home. Pt has been issued handouts to increase clarity and carry-over. No further skilled OT services indicated at this time. Current Level of Function (ADLs/self-care): mod I to independent for ADLs and mobility    Functional Outcome Measure: The patient scored 95/100 on the Barthel outcome measure which is indicative of minimal functional impairment. Other factors to consider for discharge: back precautions; high PLOF (works, drives, active)     PLAN :  Recommendation for discharge: (in order for the patient to meet his/her long term goals)  No skilled occupational therapy/ follow up rehabilitation needs identified at this time. This discharge recommendation:  Has been made in collaboration with the attending provider and/or case management    IF patient discharges home will need the following DME: none       SUBJECTIVE:   Patient stated I can touch my head and brush my teeth, I'm good to go.     OBJECTIVE DATA SUMMARY:   HISTORY:   History reviewed. No pertinent past medical history.   Past Surgical History:   Procedure Laterality Date    HX COLONOSCOPY      HX HERNIA REPAIR      HX TONSILLECTOMY      AS A CHILD    HX WISDOM TEETH EXTRACTION      IL KNEE ARTHROSCOPY WITH PARTIAL MEDIAL OR LATERAL MENISCECTOMY Right 1984     Prior Level of Function/Environment/Context: active and independent; pt works and drives  Expanded or extensive additional review of patient history:     Home Situation  Tub or Shower Type: Shower    Hand dominance: Right    EXAMINATION OF PERFORMANCE DEFICITS:  Cognitive/Behavioral Status:  Neurologic State: Alert  Orientation Level: Oriented X4  Cognition: Appropriate decision making; Appropriate for age attention/concentration; Follows commands  Perception: Appears intact  Perseveration: No perseveration noted  Safety/Judgement: Awareness of environment; Insight into deficits    Range of Motion:  AROM: Within functional limits  PROM: Within functional limits    Strength:  Strength: Within functional limits    Coordination:  Coordination: Within functional limits  Fine Motor Skills-Upper: Left Intact; Right Intact    Gross Motor Skills-Upper: Left Intact; Right Intact    Tone:  Tone: Normal    Balance:  Sitting: Intact  Standing: Intact    Functional Mobility and Transfers for ADLs:  Bed Mobility:  Rolling: (pt received OOB)    Transfers:  Sit to Stand: Independent  Stand to Sit: Independent  Bathroom Mobility: Modified independent(infer based on observations)    ADL Assessment:  Feeding: Independent    Oral Facial Hygiene/Grooming: Modified Independent(for standing tasks at sink)    Bathing: Modified independent;Supervision    Upper Body Dressing: Independent    Lower Body Dressing: Modified independent(able to tailor sit)    Toileting: Modified independent    ADL Intervention and task modifications:  Grooming  Brushing Teeth: Compensatory technique training(educated on use of cups to prevent bending)  \  Lower Body Dressing Assistance  Slip on Shoes with Back: Modified independent(aboe to tailor sit)  Position Performed: Seated in chair    Cognitive Retraining  Safety/Judgement: Awareness of environment; Insight into deficits    Patient instructed and demonstrated 3/3 spine precautions with verbal cues. Patient instructed and indicated understanding the benefits of maintaining activity tolerance, functional mobility, and independence with self care tasks during acute stay  to ensure safe return home and to baseline. Encouraged patient to increase frequency and duration OOB, not sitting longer than 30 mins without marching/walking with staff, be out of bed for all meals, perform daily ADLs (as approved by RN/MD regarding bathing etc), and performing functional mobility to/from bathroom. Patient instruction and indicated understanding on body mechanics, ergonomics and gravitational force on the spine during different body positions to plan activities in prep for return home to complete basic ADLs, instrumental ADLs and back to work safely. Bathing: Patient instructed and indicated understanding when bathing to not submerge wound in water, stand to shower or sponge bathe, cover wound with plastic and tape to ensure no water reaches bandage/wound without cues. Instructed patient to perform shower transfer (when ready) dry for safety prior to attempting wet for safety and fall prevention. Instructed patient to have supervision from family member/significant other when performing wet shower transfer for safety. Instructed patient to use clean washcloth and towel to clean/pat dry area around incision for infection control. Patient verbalized understanding of same. Dressing lower body: Patient instructed to don brace first and on the benefits to remain seated to don all clothing to increase independence with precautions and pain management. Patient instructed and demonstrated tailor sitting for lower body dressing with mod I. Toileting: Patient instructed on the benefits of using flushable wet wipes and toilet tongs if decreased reach or pain for carlitos care. Also, the benefits of a reacher to aid in clothing management.      Home safety: Patient instructed and indicated understanding on home modifications and safety [raise height of ADL objects (i.e. clothing, sink items, fridge items, items to mouth when grooming), appropriate height of chair surfaces, recliner safety, change of floor surfaces, clear pathways] to increase independence and fall prevention. Functional Measure:  Barthel Index:    Bathin  Bladder: 10  Bowels: 10  Groomin  Dressing: 10  Feeding: 10  Mobility: 15  Stairs: 5  Toilet Use: 10  Transfer (Bed to Chair and Back): 15  Total: 95/100        The Barthel ADL Index: Guidelines  1. The index should be used as a record of what a patient does, not as a record of what a patient could do. 2. The main aim is to establish degree of independence from any help, physical or verbal, however minor and for whatever reason. 3. The need for supervision renders the patient not independent. 4. A patient's performance should be established using the best available evidence. Asking the patient, friends/relatives and nurses are the usual sources, but direct observation and common sense are also important. However direct testing is not needed. 5. Usually the patient's performance over the preceding 24-48 hours is important, but occasionally longer periods will be relevant. 6. Middle categories imply that the patient supplies over 50 per cent of the effort. 7. Use of aids to be independent is allowed. Sarah Brugos., Barthel, D.W. (7517). Functional evaluation: the Barthel Index. [address] [employer] (14)2. Ana Dao richard TOBI Gray, Valeriano Rojo., Henderson Nicole., [address] (). Measuring the change indisability after inpatient rehabilitation; comparison of the responsiveness of the Barthel Index and Functional Roosevelt Measure. Journal of Neurology, Neurosurgery, and Psychiatry, 66(4), 162-821. Raymundo Rivera, N.J.A, Yifan Rebolledo  WLucyJ.M, & SULLY AzevedoA. (2004.) Assessment of post-stroke quality of life in cost-effectiveness studies:  The usefulness of the Barthel Index and the EuroQoL-5D. Quality of Life Research, 15, 631-61     Occupational Therapy Evaluation Charge Determination   History Examination Decision-Making   LOW Complexity : Brief history review  LOW Complexity : 1-3 performance deficits relating to physical, cognitive , or psychosocial skils that result in activity limitations and / or participation restrictions  LOW Complexity : No comorbidities that affect functional and no verbal or physical assistance needed to complete eval tasks       Based on the above components, the patient evaluation is determined to be of the following complexity level: LOW   Pain Rating:  Pt reporting minimal pain    Activity Tolerance:   Good and SpO2 stable on RA    After treatment patient left in no apparent distress:    Sitting in chair and Call bell within reach; PA present    COMMUNICATION/EDUCATION:   The patients plan of care was discussed with: Physical therapist and Registered nurse.      Thank you for this referral.  Louisa Winn OT  Time Calculation: 8 mins

## 2020-12-16 NOTE — DISCHARGE SUMMARY
Spine Discharge Summary    Patient ID:  Naomi Hamilton  [id number]  male  63 y.o.  1960    Admit date: 12/15/2020    Discharge date: 12/16/2020    Admitting Physician: Ulysses Jeans, MD     Consulting Physician(s):   Treatment Team: Primary Nurse: Angel Bautsita; Utilization Review: Ernst Cifuentes RN    Date of Surgery:   12/15/2020     Preoperative Diagnosis:  LUMBAR STENOSIS    Postoperative Diagnosis:   LUMBAR STENOSIS    Procedure(s):  L2-L5 LAMINECTOMY     Anesthesia Type:   General     Surgeon: Naina Menezes MD                            HPI:  Pt is a 63 y.o. male who has a history of LUMBAR STENOSIS  with pain and limitations of activities of daily living who presents at this time for a L2-L5 LAMINECTOMY  following the failure of conservative management. PMH: History reviewed. No pertinent past medical history. Body mass index is 26.49 kg/m². : A BMI > 30 is classified as obesity and > 40 is classified as morbid obesity. Medications upon admission :   Prior to Admission Medications   Prescriptions Last Dose Informant Patient Reported? Taking? cholecalciferol, vitamin D3, (VITAMIN D3 PO) 12/8/2020 at Unknown time  Yes Yes   Sig: Take 1 Tab by mouth daily. fexofenadine HCl (ALLEGRA ALLERGY PO) 12/14/2020 at Unknown time  Yes Yes   Sig: Take 1 Tab by mouth daily. gabapentin (NEURONTIN) 300 mg capsule 12/15/2020 at Unknown time  Yes No   Sig: Take 300 mg by mouth four (4) times daily. Facility-Administered Medications: None        Allergies: Allergies   Allergen Reactions    Toradol [Ketorolac] Anaphylaxis        Hospital Course: The patient underwent surgery. Complications:  None; patient tolerated the procedure well. Was taken to the PACU in stable condition and then transferred to the ortho floor.       Perioperative Antibiotics:  Ancef     Postoperative Pain Management:  Oxycodone & Tylenol     Postoperative transfusions:    Number of units banked PRBCs =   none     Post Op complications: none    Hemoglobin at discharge:    Lab Results   Component Value Date/Time    HGB 14.6 12/07/2020 08:30 AM       Dressing was changed on POD # 1. Incision - clean, dry and intact. No significant erythema or swelling. Wound appears to be healing without any evidence of infection. Neurovascular exam found to be within normal limits. Physical Therapy started following surgery and participated in bed mobility, transfers and ambulation. Gait:  Gait  Base of Support: Widened                   Discharged to: Home. Condition on Discharge:   good    Discharge instructions:  - Take pain medications as prescribed  - Resume pre hospital diet      - Discharge activity: activity as tolerated  - Ambulate as tolerated  - Avoid bending, lifting and twisting  - Wound Care Keep wound clean and dry. See discharge instruction sheet. -DISCHARGE MEDICATION LIST     Discharge Medication List as of 12/16/2020 10:47 AM      START taking these medications    Details   oxyCODONE IR (ROXICODONE) 5 mg immediate release tablet Take 1 Tab by mouth every four (4) hours as needed for Pain for up to 14 days. Max Daily Amount: 30 mg., Normal, Disp-50 Tab, R-0      docusate sodium (COLACE) 50 mg capsule Take 1 Cap by mouth two (2) times a day for 90 days. , Normal, Disp-60 Cap, R-2         CONTINUE these medications which have CHANGED    Details   gabapentin (NEURONTIN) 300 mg capsule Take 1 Cap by mouth four (4) times daily. Max Daily Amount: 1,200 mg., Normal, Disp-56 Cap, R-0         CONTINUE these medications which have NOT CHANGED    Details   cholecalciferol, vitamin D3, (VITAMIN D3 PO) Take 1 Tab by mouth daily. , Historical Med      fexofenadine HCl (ALLEGRA ALLERGY PO) Take 1 Tab by mouth daily. , Historical Med          per medical continuation form      -Follow up in office in 2 weeks      Signed:  DAVIDSON Lacey     12/16/2020  12:35 PM

## 2020-12-16 NOTE — PROGRESS NOTES
Spine Surgery Progress Note  Jaquelin Bagley PA-C    Admit Date: 12/15/2020   LOS: 0 days      Daily Progress Note: 2020    POD:1 Day Post-Op    S/P: Procedure(s):  L2-L5 LAMINECTOMY    Subjective:     Mr. Lorin Posey is a pleasant, previously healthy 63year old male. Patient presented to Dr. Bernardo Lower office with progressive neurogenic claudication with severe bilateral leg pain, left worse than right. Gabapentin has helped somewhat but has not relieved his symptoms. Epidural injections provided very little, temporary relief. MRI revealed L4-5 disc herniation with mild central stenosis, bilateral foraminal stenosis and moderate  facet arthropathy. L3-4 moderate severe central stenosis with broad bulge. And L2-3 moderate-severe central stenosis, broad bulge, mild foraminal stenosis bilaterally with facet arthropathy. Patient underwent a L2-5 laminectomy on 12/15/20. At present, patient denies chest pain, nausea, vomiting, difficulty swallowing, headache, and dyspnea. Patient is sitting comfortably in a chair. Objective:     Vital signs  VSS Afebrile. Temp (24hrs), Av.4 °F (36.9 °C), Min:97.2 °F (36.2 °C), Max:99.1 °F (37.3 °C)   No intake/output data recorded.  1901 -  0700  In: 1000 [I.V.:1000]  Out: 100     Visit Vitals  /86 (BP 1 Location: Right arm, BP Patient Position: Sitting)   Pulse 88   Temp 98.8 °F (37.1 °C)   Resp 16   Ht 5' 8.5\" (1.74 m)   Wt 80.2 kg (176 lb 12.9 oz)   SpO2 93%   BMI 26.49 kg/m²    O2 Flow Rate (L/min): 2 l/min O2 Device: Room air     Voiding status: +        Pain control  Pain Assessment  Pain Scale 1: Numeric (0 - 10)  Pain Intensity 1: 5(pt states it increases to 8 upon movement)  Pain Onset 1: postop  Pain Location 1: Back  Pain Orientation 1: Lower  Pain Description 1: Aching  Pain Intervention(s) 1: Medication (see MAR)    Physical Exam:  Gen: No acute distress. Neuro: A&Ox3. Follows commands. Speech clear. Affect normal.  SANTILLAN spontaneously.  Strength 5/5 in UE and LE BL. Sensation intact. Gait deferred. Calves soft and supple; No pain with passive stretch  Skin: Incision C/D/I    24 hour results:    No results found for this or any previous visit (from the past 24 hour(s)). Assessment:     Active Problems:    Lumbar stenosis with neurogenic claudication (12/15/2020)      Plan:     s/p L2-5 Laminectomy 12/15/20  -PT/OT - cleared.  Patient ambulating on his own without issue.    -Pain control - scheduled tylenol, prn oxycodone    -Diet - tolerating   -Voiding without issue   -Follow up with Dr. Antolin Alicia in 2 weeks    Readiness for discharge:     [x] Vital Signs stable    [x] + Voiding    [x] Wound intact, drainage minimal    [x] Tolerating PO intake     [x] Cleared by PT (OT if applicable)    [x] Adequate pain control on oral medication alone     Activity: as tolerated  DVT ppx: SCDs  Dispo: discharge to home this AM    Plan d/w Dr. Jaime Gibson, PA

## 2020-12-16 NOTE — PROGRESS NOTES
PHYSICAL THERAPY EVALUATION/DISCHARGE  Patient: Rashawn Montez (18 y.o. male)  Date: 12/16/2020  Primary Diagnosis: Lumbar stenosis with neurogenic claudication [M48.062]  Procedure(s) (LRB):  L2-L5 LAMINECTOMY (N/A) 1 Day Post-Op   Precautions: spine         ASSESSMENT  Based on the objective data described below, the patient presents with decreased mobility, stability, increased pain and stiffness minimally but overall WFL and safe for independent movement s/p recent L2-5 lami POD #1. Pt received in chair, ascends and begins moving around room without difficulty. Pt tolerates gait training well without issue, moderate cues initially for maintaining no BLT as he performs stretching during walking. Reviewed back precautions, sitting limit of 30-45 minutes, positioning in chair, and progress. Pt is cleared for discharge from Physical Therapy standpoint at this time, recommend no therapy follow up as patient moving well. Recommend outpatient PT for core training if patient desires as he is a self-proclaimed \"gym rat\" and may not have good core control/hygiene. Functional Outcome Measure: The patient scored 100/100 on the barthel outcome measure which is indicative of no complexity. Other factors to consider for discharge: patient independent     Further skilled acute physical therapy is not indicated at this time. PLAN :  Recommendation for discharge: (in order for the patient to meet his/her long term goals)  No skilled physical therapy/ follow up rehabilitation needs identified at this time. This discharge recommendation:  Has been made in collaboration with the attending provider and/or case management    IF patient discharges home will need the following DME: none       SUBJECTIVE:   Patient stated I am doing just fine.     OBJECTIVE DATA SUMMARY:   HISTORY:    History reviewed. No pertinent past medical history.   Past Surgical History:   Procedure Laterality Date    HX COLONOSCOPY      HX HERNIA REPAIR      HX TONSILLECTOMY      AS A CHILD    HX WISDOM TEETH EXTRACTION      GA KNEE ARTHROSCOPY WITH PARTIAL MEDIAL OR LATERAL MENISCECTOMY Right 1984       Prior level of function: independent  Personal factors and/or comorbidities impacting plan of care:     Home Situation  Tub or Shower Type: Shower    EXAMINATION/PRESENTATION/DECISION MAKING:   Critical Behavior:  Neurologic State: Alert  Orientation Level: Oriented X4  Cognition: Appropriate decision making, Appropriate for age attention/concentration, Follows commands  Safety/Judgement: Awareness of environment, Insight into deficits  Hearing:     Skin:  intact  Edema: none  Range Of Motion:  AROM: Within functional limits           PROM: Within functional limits           Strength:    Strength: Within functional limits                    Tone & Sensation:   Tone: Normal                              Coordination:  Coordination: Within functional limits  Vision:      Functional Mobility:  Bed Mobility:  Rolling: (pt received OOB)           Transfers:  Sit to Stand: Independent  Stand to Sit: Independent  Stand Pivot Transfers: Independent                    Balance:   Sitting: Intact  Standing: Intact  Ambulation/Gait Training:  Distance (ft): 400 Feet (ft)  Assistive Device: Other (comment)(none)  Ambulation - Level of Assistance: Independent     Gait Description (WDL): Exceptions to WDL  Gait Abnormalities: (guarded)        Base of Support: Widened                           Stairs:  Number of Stairs Trained: 14  Stairs - Level of Assistance: Independent   Rail Use: Right          Functional Measure:  Barthel Index:    Bathin  Bladder: 10  Bowels: 10  Groomin  Dressing: 10  Feeding: 10  Mobility: 15  Stairs: 5  Toilet Use: 10  Transfer (Bed to Chair and Back): 15  Total: 95/100       The Barthel ADL Index: Guidelines  1. The index should be used as a record of what a patient does, not as a record of what a patient could do.   2. The main aim is to establish degree of independence from any help, physical or verbal, however minor and for whatever reason. 3. The need for supervision renders the patient not independent. 4. A patient's performance should be established using the best available evidence. Asking the patient, friends/relatives and nurses are the usual sources, but direct observation and common sense are also important. However direct testing is not needed. 5. Usually the patient's performance over the preceding 24-48 hours is important, but occasionally longer periods will be relevant. 6. Middle categories imply that the patient supplies over 50 per cent of the effort. 7. Use of aids to be independent is allowed. Aleksandra Salgado., Barthel, D.W. (1228). Functional evaluation: the Barthel Index. [address] [facility] (14)2. TOBI Carranza, Yanira Vasquez., Reji Moffett, [place], [facility] [facility] (1999). Measuring the change indisability after inpatient rehabilitation; comparison of the responsiveness of the Barthel Index and Functional Hodgeman Measure. Journal of Neurology, Neurosurgery, and Psychiatry, 66(4), 564-051. Reagan Poole, N.J.A, EDDIE Gutierrez, & Alfredo Morin, MLucyA. (2004.) Assessment of post-stroke quality of life in cost-effectiveness studies: The usefulness of the Barthel Index and the EuroQoL-5D.  Quality of Life Research, 15, 879-62            Physical Therapy Evaluation Charge Determination   History Examination Presentation Decision-Making   HIGH Complexity :3+ comorbidities / personal factors will impact the outcome/ POC  LOW Complexity : 1-2 Standardized tests and measures addressing body structure, function, activity limitation and / or participation in recreation  LOW Complexity : Stable, uncomplicated  LOW Complexity : FOTO score of       Based on the above components, the patient evaluation is determined to be of the following complexity level: LOW     Pain Rating:  Controlled    Activity Tolerance: Good      After treatment patient left in no apparent distress:   Sitting in chair    COMMUNICATION/EDUCATION:   The patients plan of care was discussed with: Registered nurse and Case management. Fall prevention education was provided and the patient/caregiver indicated understanding. and Patient/family have participated as able in goal setting and plan of care.     Thank you for this referral.  Ariel Carrizales, PT   Time Calculation: 22 mins

## 2020-12-16 NOTE — PROGRESS NOTES
Problem: Falls - Risk of  Goal: *Absence of Falls  Description: Document Akin Chapa Fall Risk and appropriate interventions in the flowsheet.   Outcome: Resolved/Met  Note: Fall Risk Interventions:            Medication Interventions: Patient to call before getting OOB                   Problem: Patient Education: Go to Patient Education Activity  Goal: Patient/Family Education  Outcome: Resolved/Met     Problem: Simple Spine Procedure:  Day of Surgery  Goal: Off Pathway (Use only if patient is Off Pathway)  Outcome: Resolved/Met  Goal: Activity/Safety  Outcome: Resolved/Met  Goal: Consults, if ordered  Outcome: Resolved/Met  Goal: Nutrition/Diet  Outcome: Resolved/Met  Goal: Discharge Planning  Outcome: Resolved/Met  Goal: Medications  Outcome: Resolved/Met  Goal: Respiratory  Outcome: Resolved/Met  Goal: Treatments/Interventions/Procedures  Outcome: Resolved/Met  Goal: Psychosocial  Outcome: Resolved/Met  Goal: *Verbalizes understanding of type and use of pain medication  Outcome: Resolved/Met  Goal: *Optimal pain control at patient's stated goal  Outcome: Resolved/Met  Goal: *Verbalizes/demonstrates understanding of proper body mechanics and use of stabilization device if ordered  Outcome: Resolved/Met  Goal: *Activity level attained as ordered  Outcome: Resolved/Met  Goal: *Active bowel sounds  Outcome: Resolved/Met  Goal: *Respiratory status stable  Outcome: Resolved/Met  Goal: *Adequate urinary output  Outcome: Resolved/Met  Goal: *Hemodynamically stable  Outcome: Resolved/Met     Problem: Simple Spine Procedure:  Post Op Day 1/Day of Discharge  Goal: Off Pathway (Use only if patient is Off Pathway)  Outcome: Resolved/Met  Goal: Activity/Safety  Outcome: Resolved/Met  Goal: Nutrition/Diet  Outcome: Resolved/Met  Goal: Discharge Planning  Outcome: Resolved/Met  Goal: Medications  Outcome: Resolved/Met  Goal: Respiratory  Outcome: Resolved/Met  Goal: Treatments/Interventions/Procedures  Outcome: Resolved/Met  Goal: Psychosocial  Outcome: Resolved/Met     Problem: Simple Spine Procedure: Discharge Outcomes  Goal: *Optimal pain control at patient's stated goal  Outcome: Resolved/Met  Goal: *Demonstrates ability to place and remove stabilization device  Outcome: Resolved/Met  Goal: *Progress independence mobility/activities (eg: Mobility precautions)  Outcome: Resolved/Met  Goal: *Resumes normal function of bladder and bowel  Outcome: Resolved/Met  Goal: *Lungs clear or at baseline  Outcome: Resolved/Met  Goal: *Verbalizes name, dosage, time, side effects, and number of days to continue medications  Outcome: Resolved/Met  Goal: *Modified independence with transfers, ambulation on levels with assistance devices, stair climbing, ADL's  Outcome: Resolved/Met  Goal: *Describes follow-up/return visits to physicians  Outcome: Resolved/Met  Goal: *Describes available resources and support systems  Outcome: Resolved/Met  Goal: *Labs within defined limits  Outcome: Resolved/Met  Goal: *Tolerating diet  Outcome: Resolved/Met     Problem: Pain  Goal: *Control of Pain  Outcome: Resolved/Met  Goal: *PALLIATIVE CARE:  Alleviation of Pain  Outcome: Resolved/Met     Problem: Patient Education: Go to Patient Education Activity  Goal: Patient/Family Education  Outcome: Resolved/Met

## 2020-12-16 NOTE — PROGRESS NOTES
Observation notice provided in writing to patient and/or caregiver as well as verbal explanation of the policy. Patients who are in outpatient status also receive the Observation notice.       Jamila Henry MS

## 2020-12-16 NOTE — DISCHARGE INSTRUCTIONS
After Hospital Care Plan:  Discharge Instructions Lumbar Laminectomy Surgery Dr. Bud Pederson     Patient Name: Amy Boyd    Date of procedure: 12/15/2020  Date of discharge: 12/16/2020    Procedure: Procedure(s):  L2-L5 LAMINECTOMY  PCP: Bhavesh Landeros MD    Follow up appointments  -Follow up with Dr. Bud Pederson in 2 weeks. Call [phone] to make an appointment as soon as you get home from the hospital.    When to call your Spine Surgeon:  -Signs of infection-if your incision is red; continues to have drainage; drainage has a foul odor or if you have a persistent fever over 101 degrees for 24 hours  -Nausea or vomiting, severe headache  -Loss of bowel or bladder function, inability to urinate  -Changes in sensation in your arms or legs (numbness, tingling, loss of color)  -Increased weakness-greater than before your surgery  -Severe pain or pain not relieved by medications  -Signs of a blood clot in your leg-calf pain, tenderness, redness, swelling of lower leg    When to call your Primary Care Physician:  -Concerns about medical conditions such as diabetes, high blood pressure, asthma, congestive heart failure  -Call if blood sugars are elevated, persistent headache or dizziness, coughing or congestion, constipation or diarrhea, burning with urination, abnormal heart rate    When to call 911 and go to the nearest emergency room:  -Acute onset of chest pain, shortness of breath, difficulty breathing    Activity  - You are going home a well person, be as active as possible. Your only exercise should be walking. Start with short frequent walks and increase your walking distance each day.  -Limit the amount of time you sit to 20-30 minute intervals. Sitting for prolonged periods of time will be uncomfortable for you following surgery.  -Do NOT lift anything over 5 pounds  -Do NOT do any straining, twisting or bending  -When you are in bed, you may lay on your back or on either side.   Do NOT lie on your stomach    Brace  -Not all patients require a brace, your physician or their assistant will advise on whether you need a brace prior to discharge. -If you have a back brace, you should wear your brace at all times when you are out of bed. Do not wear the brace while in bed or showering.  -Remember to always wear a cotton t-shirt underneath your brace.  -Do not bend or twist when your brace is off    Diet  -Resume usual diet; drink plenty of fluids; eat foods high in fiber  -It is important to have regular bowel movements. Pain medications may cause constipation. You may want to take a stool softener (such as Senokot-S or Colace) to prevent constipation.  -If constipation occurs, take a laxative (such as Dulcolax tablets, Milk of Magnesia, or a suppository). Laxatives should only be used if the above preventable measures have failed and you still have not had a bowel movement after three days    Driving  -You may not drive or return to work until instructed by your physician. However, you may ride in the car for short periods of time. Incision Care  -You may take brief showers but do not run the water run directly onto the wound. After showering or bathing, remove the wet dressing and gently blot the wound dry with a soft towel.  -Do not rub or apply any lotions or ointments to your incision site.   -Do not soak or scrub your wound  -Keep a dry dressing (ABD and paper tape) on your incision and have it changed daily for 14 days after surgery; more often if your incision is draining. Have your caregiver wash their hands thoroughly before changing your dressing.  -You will have absorbable sutures and skin glue     Showering  -You may shower in approximately 2 days after your surgery.    -Leave the dressing on during your shower. Do NOT allow the water to run directly onto your dressing. Once you get out of the shower, put on a dry dressing.  -Reminder- your brace can be removed while showering. Remember to not bend or twist while your brace is off.    -Do not take a tub bath. Preventing blood clots  -You have been given T.E.D. stockings to wear. Continue to wear these for 7 days after your discharge. Put them on in the morning and take them off at night.    -They are used to increase your circulation and prevent blood clots from forming in your legs  -T. E.D. stockings can be machine washed, temperature not to exceed 160° F (71°C) and machine dried for 15 to 20 minutes, temperature not to exceed 250° F (121°C). Pain management  -Take pain medication as prescribed; decrease the amount you use as your pain lessens  -Do not wait until you are in extreme pain to take your medication.  -Avoid alcoholic beverages while taking pain medication    Pain Medication Safety  DO:  -Read the Medication Guide   -Take your medicine exactly as prescribed   -Store your medicine away from children and in a safe place   -Call your healthcare provider for medical advice about side effects. You may  report side effects to FDA at 5-851-FDA-2906.   -Please be aware that many medications contain Tylenol. We do not want you to over medicate so please read the information below as a guide. Do not take more than 4 Grams of Tylenol in a 24 hour period. (There are 1000 milligrams in one Gram)                                                                                                                                                                                                                                                                                      Percocet contains 325 mg of Tylenol per tablet (do not take more than 12 tablets in 24 hours)  Lortab contains 500 mg of Tylenol per tablet (do not take more than 8 tablets in 24 hours)  Norco contains 325 mg of Tylenol per tablet (do not take more than 12 tablets in 24 hours).   DO NOT:  -Do not give your medicine to others   -Do not take medicine unless it was prescribed for you   -Do not stop taking your medicine without talking to your healthcare provider   -Do not break, chew, crush, dissolve, or inject your medicine. If you cannot  swallow your medicine whole, talk to your healthcare provider.  -Do not drink alcohol while taking this medicine  -Do not take anti-inflammatory medications or aspirin unless instructed by your  physician.

## 2020-12-16 NOTE — PROGRESS NOTES
Problem: Falls - Risk of  Goal: *Absence of Falls  Description: Document Veatrice Marker Fall Risk and appropriate interventions in the flowsheet.   Outcome: Progressing Towards Goal  Note: Fall Risk Interventions:            Medication Interventions: Patient to call before getting OOB                   Problem: Patient Education: Go to Patient Education Activity  Goal: Patient/Family Education  Outcome: Progressing Towards Goal     Problem: Simple Spine Procedure:  Day of Surgery  Goal: Off Pathway (Use only if patient is Off Pathway)  Outcome: Progressing Towards Goal  Goal: Activity/Safety  Outcome: Progressing Towards Goal  Goal: Consults, if ordered  Outcome: Progressing Towards Goal  Goal: Nutrition/Diet  Outcome: Progressing Towards Goal  Goal: Discharge Planning  Outcome: Progressing Towards Goal  Goal: Medications  Outcome: Progressing Towards Goal  Goal: Respiratory  Outcome: Progressing Towards Goal  Goal: Treatments/Interventions/Procedures  Outcome: Progressing Towards Goal  Goal: Psychosocial  Outcome: Progressing Towards Goal  Goal: *Verbalizes understanding of type and use of pain medication  Outcome: Progressing Towards Goal  Goal: *Optimal pain control at patient's stated goal  Outcome: Progressing Towards Goal  Goal: *Verbalizes/demonstrates understanding of proper body mechanics and use of stabilization device if ordered  Outcome: Progressing Towards Goal  Goal: *Activity level attained as ordered  Outcome: Progressing Towards Goal  Goal: *Active bowel sounds  Outcome: Progressing Towards Goal  Goal: *Respiratory status stable  Outcome: Progressing Towards Goal  Goal: *Adequate urinary output  Outcome: Progressing Towards Goal  Goal: *Hemodynamically stable  Outcome: Progressing Towards Goal

## 2020-12-16 NOTE — OP NOTES
[address]   OPERATIVE REPORT    Name:  Venita Mccarty  MR#:  [id number]  :  1960  ACCOUNT #:  [de-identified]  DATE OF SERVICE:  12/15/2020      PREOPERATIVE DIAGNOSIS:  Lumbar stenosis L2 through L5, central disk L4-5. POSTOPERATIVE DIAGNOSES:  Lumbar stenosis L2 through L5, left L3-4 facet cyst.    PROCEDURES PERFORMED:  L2 through L5 laminectomy with left L3-4 medial facetectomy and resection of small facet cyst.    SURGEON:  Jimena Rhoades MD    ASSISTANT:  Kymberly Nielsen SA    ANESTHESIA:  General endotracheal anesthesia. COMPLICATIONS:  None. SPECIMENS REMOVED:  None. IMPLANTS:  None. ESTIMATED BLOOD LOSS:  Less than 100 mL. OPERATIVE INDICATIONS:  This is a 70-year-old gentleman who had progressive neurogenic claudication, worse on the left. Workup revealed high-grade stenosis L3-4 and moderate L4-5 and L5-S1. He had a central disk at L4-5. After discussing treatment options and risks of surgery, informed consent was obtained. OPERATION IN DETAIL:  The patient was taken to the operating room, placed under general endotracheal anesthesia. All necessary lines and monitors were placed. Given appropriate dose of IV antibiotics. SCDs were placed. He was placed prone on the Conor frame. All pressure points were padded. The lumbar sacral region was prepped and draped in standard sterile fashion. A midline incision was made from L2 to L5 with a skin knife, carried down with Bovie electrocautery in the avascular midline plane. Subperiosteal dissection was performed on the bilateral lamina of L2, L3, L4, and L5. Localizing x-ray was obtained. Self-retaining retractors placed. Leksell rongeur was used to remove the spinous processes of inferior L2, L3, L4, and L5. High-speed drill was used to thin down the lamina and medial facets. Curettes and Kerrisons were used to perform wide laminectomy of caudal L2, all of L3, L4, and L5.   There was significant stenosis in the lateral recesses, especially at L3-4 but at L2-3 and L4-5 as well. The decompression was performed. There was a small facet cyst coming off the left-sided L3-4. There was some adherence to the thecal sac. This was cleanly dissected and medial facetectomy was performed and the lateral recesses bilaterally were decompressed at all levels. The nerve roots were palpated to be freed going up the foramen. I then retracted the thecal sac slightly and looked at the L4-5 disk, while there was a small central protrusion. There did not appear to be a gross herniation or truly lateralizing pathology, therefore I left the disk in place. The wound was copiously irrigated with antibiotic solution. Hemostasis was achieved. The dura was a little bit thin, therefore, I placed a little bit of DuraSeal over the dura and a small piece of Gelfoam.  I did not need to place a Hemovac drain as the wound was quite dry with removal of retraction. It was copiously irrigated out. The wound was then closed using 0 Vicryl to close the deep fascial layer, 2-0 Vicryl in the deep dermal layer, and running 4-0 Monocryl in a subcuticular fashion. The wounds were cleaned and dried and dressed with sterile dressing. The patient was flipped over, extubated, taken to recovery room in stable condition.         MD DAVIDSON [email or web address]  D:  12/15/2020 13:20  T:  12/15/2020 20:04  JOB #:  [id number]  CC:  Candance John, MD

## 2020-12-16 NOTE — PROGRESS NOTES
Pt seen for PT eval, cleared to DC without home health services. Full note to follow.      Marlen Tripathi, DPT, PT

## 2020-12-30 ENCOUNTER — HOSPITAL ENCOUNTER (EMERGENCY)
Age: 60
Discharge: HOME | End: 2020-12-30
Attending: EMERGENCY MEDICINE
Payer: COMMERCIAL

## 2020-12-30 VITALS
OXYGEN SATURATION: 100 % | HEART RATE: 97 BPM | WEIGHT: 169.97 LBS | RESPIRATION RATE: 16 BRPM | TEMPERATURE: 98.3 F | HEIGHT: 68 IN | BODY MASS INDEX: 25.76 KG/M2 | DIASTOLIC BLOOD PRESSURE: 86 MMHG | SYSTOLIC BLOOD PRESSURE: 137 MMHG

## 2020-12-30 DIAGNOSIS — L50.9 URTICARIA: Primary | ICD-10-CM

## 2020-12-30 PROCEDURE — 96374 THER/PROPH/DIAG INJ IV PUSH: CPT

## 2020-12-30 PROCEDURE — 99284 EMERGENCY DEPT VISIT MOD MDM: CPT

## 2020-12-30 PROCEDURE — 96375 TX/PRO/DX INJ NEW DRUG ADDON: CPT

## 2020-12-30 PROCEDURE — 99283 EMERGENCY DEPT VISIT LOW MDM: CPT

## 2020-12-30 PROCEDURE — 74011250636 HC RX REV CODE- 250/636: Performed by: EMERGENCY MEDICINE

## 2020-12-30 RX ORDER — FAMOTIDINE 20 MG/1
20 TABLET, FILM COATED ORAL 2 TIMES DAILY
Qty: 20 TAB | Refills: 0 | Status: SHIPPED | OUTPATIENT
Start: 2020-12-30 | End: 2021-01-09

## 2020-12-30 RX ORDER — DIPHENHYDRAMINE HYDROCHLORIDE 50 MG/ML
25 INJECTION, SOLUTION INTRAMUSCULAR; INTRAVENOUS
Status: COMPLETED | OUTPATIENT
Start: 2020-12-30 | End: 2020-12-30

## 2020-12-30 RX ORDER — METHYLPREDNISOLONE 4 MG/1
TABLET ORAL
COMMUNITY
Start: 2020-12-28

## 2020-12-30 RX ADMIN — SODIUM CHLORIDE 500 ML: 900 INJECTION, SOLUTION INTRAVENOUS at 04:42

## 2020-12-30 RX ADMIN — DIPHENHYDRAMINE HYDROCHLORIDE 25 MG: 50 INJECTION, SOLUTION INTRAMUSCULAR; INTRAVENOUS at 04:42

## 2020-12-30 RX ADMIN — FAMOTIDINE 20 MG: 10 INJECTION INTRAVENOUS at 04:38

## 2020-12-30 RX ADMIN — METHYLPREDNISOLONE SODIUM SUCCINATE 125 MG: 125 INJECTION, POWDER, FOR SOLUTION INTRAMUSCULAR; INTRAVENOUS at 04:40

## 2020-12-30 NOTE — ED NOTES
MD to update patient on plan of care. Patient  given copy of dc instructions and  script(s). Patient verbalized understanding of instructions and script (s). Patient alert and oriented and in no acute distress. Patient discharged home ambulatory with self.

## 2020-12-30 NOTE — ED PROVIDER NOTES
The patient is a 53-year-old male with a past medical history significant for spinal stenosis, status post lumbar laminectomy who presents to the ED with a complaint of generalized itching with rash and swelling that began approximately 3 days ago and has been the progressively worse. The patient strongly taking a Medrol Dosepak and Benadryl every 4 hours for symptoms with positive facet, discomfort. He denies any fever chills, sore throat, cough or congestion, headache, neck and back pain, chest pain, shortness of breath, vomiting, diarrhea, constipation, dysuria, extremity weakness or numbness, sick contact, skin rash, recent travel. The patient state that he gets these symptoms occasionally and has been tested in several occasions without any significant findings. He is not sure what triggered these episodes. Past Medical History:   Diagnosis Date    Spinal stenosis        Past Surgical History:   Procedure Laterality Date    HX COLONOSCOPY      HX HERNIA REPAIR      HX LUMBAR LAMINECTOMY      HX TONSILLECTOMY      AS A CHILD    HX WISDOM TEETH EXTRACTION      [place] KNEE ARTHROSCOPY WITH PARTIAL MEDIAL OR LATERAL MENISCECTOMY Right 1984         Family History:   Problem Relation Age of Onset    Hypertension Mother     Cancer Father         BLADDER     Anesth Problems Neg Hx        Social History     Socioeconomic History    Marital status:      Spouse name: Not on file    Number of children: Not on file    Years of education: Not on file    Highest education level: Not on file   Occupational History    Not on file   Social Needs    Financial resource strain: Not on file    Food insecurity     Worry: Not on file     Inability: Not on file    Transportation needs     Medical: Not on file     Non-medical: Not on file   Tobacco Use    Smoking status: Never Smoker    Smokeless tobacco: Never Used   Substance and Sexual Activity    Alcohol use:  Yes     Alcohol/week: 1.0 standard drinks     Types: 1 Glasses of wine per week     Comment: 1 DRINK DAILY     Drug use: Never    Sexual activity: Not on file   Lifestyle    Physical activity     Days per week: Not on file     Minutes per session: Not on file    Stress: Not on file   Relationships    Social connections     Talks on phone: Not on file     Gets together: Not on file     Attends [employer] service: Not on file     Active member of club or organization: Not on file     Attends meetings of clubs or organizations: Not on file     Relationship status: Not on file    Intimate partner violence     Fear of current or ex partner: Not on file     Emotionally abused: Not on file     Physically abused: Not on file     Forced sexual activity: Not on file   Other Topics Concern    Not on file   Social History Narrative    Not on file         ALLERGIES: Toradol [ketorolac]    Review of Systems   All other systems reviewed and are negative. Vitals:    12/30/20 0421 12/30/20 0428   BP: 137/86 137/86   Pulse:  97   Resp:  16   Temp:  98.3 °F (36.8 °C)   SpO2:  100%   Weight:  77.1 kg (169 lb 15.6 oz)   Height:  5' 8\" (1.727 m)            Physical Exam  Vitals signs reviewed. CONSTITUTIONAL: Well-appearing; well-nourished; in no apparent distress  HEAD: Normocephalic; atraumatic  EYES: PERRL; EOM intact; conjunctiva and sclera are clear bilaterally. ENT: No rhinorrhea; normal pharynx with no tonsillar hypertrophy; mucous membranes pink/moist, no erythema, no exudate. NECK: Supple; non-tender; no cervical lymphadenopathy  CARD: Normal S1, S2; no murmurs, rubs, or gallops. Regular rate and rhythm. RESP: Normal respiratory effort; breath sounds clear and equal bilaterally; no wheezes, rhonchi, or rales. ABD: Normal bowel sounds; non-distended; non-tender; no palpable organomegaly, no masses, no bruits. Back Exam: Normal inspection; no vertebral point tenderness, no CVA tenderness. Normal range of motion.   EXT: Normal ROM in all four extremities; non-tender to palpation; no swelling or deformity; distal pulses are normal, no edema. SKIN: Warm; dry; generalized maculopapular rash, erythematous with blanching consistent with urticaria  NEURO:Alert and oriented x 3, coherent, GRECIA-XII grossly intact, sensory and motor are non-focal.        MDM  Number of Diagnoses or Management Options  Diagnosis management comments: Assessment:  allergy reaction versus urticaria    Plan: IV fluid/Solu-Medrol/Benadryl/Pepcid Tereasa Gaxiola exam,/ Monitor and Reevaluate. Amount and/or Complexity of Data Reviewed  Clinical lab tests: ordered and reviewed  Tests in the radiology section of CPT®: ordered and reviewed  Tests in the medicine section of CPT®: reviewed and ordered  Discussion of test results with the performing providers: yes  Decide to obtain previous medical records or to obtain history from someone other than the patient: yes  Obtain history from someone other than the patient: yes  Review and summarize past medical records: yes  Discuss the patient with other providers: yes  Independent visualization of images, tracings, or specimens: yes    Risk of Complications, Morbidity, and/or Mortality  Presenting problems: low  Diagnostic procedures: low  Management options: low           Procedures  Progress Note:   Pt has been reexamined by Sasha Hernandez MD. Pt is feeling much better. Symptoms have improved. All available results have been reviewed with pt and any available family. Pt understands sx, dx, and tx in ED. Care plan has been outlined and questions have been answered. Pt is ready to go home. Will send home on urticaria/allergic reaction instruction. Prescription of Pepcid. Continue Benadryl and Solu-Medrol. Outpatient referral with PCP as needed. Written by Sasha Hernandez MD,5:17 AM    .   .

## 2020-12-30 NOTE — Clinical Note
[address] 
[facility] 
[address] [address] [phone] Work/School Note Date: 12/30/2020 To Whom It May concern: Brigido Laser was seen and treated today in the emergency room by the following provider(s): 
Attending Provider: Giselle Whitman MD. Brigido Laser is excused from work/school on 12/30/20 and 12/31/20. He is medically clear to return to work/school on 1/1/2021. Sincerely, Yoandy Maldonado MD

## 2020-12-30 NOTE — ED TRIAGE NOTES
Patient arrives reporting \"several days worth of intermittent rash/hives. \" Patient reports hx of the same, currently taking a steroid dose pack but \"feels like it is not helping at this time and needs IV Steroids\". Denies breathing concerns.

## 2022-03-19 PROBLEM — M48.062 LUMBAR STENOSIS WITH NEUROGENIC CLAUDICATION: Status: ACTIVE | Noted: 2020-12-15

## 2022-08-07 ENCOUNTER — HOSPITAL ENCOUNTER (EMERGENCY)
Age: 62
Discharge: HOME | End: 2022-08-07
Attending: EMERGENCY MEDICINE
Payer: COMMERCIAL

## 2022-08-07 ENCOUNTER — APPOINTMENT (OUTPATIENT)
Dept: GENERAL RADIOLOGY | Age: 62
End: 2022-08-07
Attending: PHYSICIAN ASSISTANT
Payer: COMMERCIAL

## 2022-08-07 VITALS
DIASTOLIC BLOOD PRESSURE: 82 MMHG | SYSTOLIC BLOOD PRESSURE: 139 MMHG | HEIGHT: 69 IN | BODY MASS INDEX: 26.12 KG/M2 | RESPIRATION RATE: 16 BRPM | WEIGHT: 176.37 LBS | OXYGEN SATURATION: 97 % | HEART RATE: 85 BPM | TEMPERATURE: 97 F

## 2022-08-07 DIAGNOSIS — S63.501A RIGHT WRIST SPRAIN, INITIAL ENCOUNTER: Primary | ICD-10-CM

## 2022-08-07 PROCEDURE — 73110 X-RAY EXAM OF WRIST: CPT

## 2022-08-07 PROCEDURE — 99283 EMERGENCY DEPT VISIT LOW MDM: CPT

## 2022-08-07 NOTE — DISCHARGE INSTRUCTIONS
Continue ice- 20 minutes on, 20 minutes off painful areas. Wear wrist splint during the day, consider wearing an ACE wrap at night or loosely wear the splint while sleeping if you have pain. Continue ibuprofen or Tylenol as directed for pain. Follow-up with Dr. Jax Regalado later this week if symptoms persist or worsen.

## 2022-08-07 NOTE — ED PROVIDER NOTES
65-year-old male with past medical history significant for carpal tunnel release in the right wrist, trigger finger in the right hand followed by Ortho, who presents amatory for evaluation of right wrist pain after he tripped and fell onto an outstretched hand just prior to arrival while playing golf. He denies head injury, LOC, neck or back pain. His pain is located to the palmar wrist.  No numbness, tingling, weakness. There is no open wound. OrthoJudkris Horowitz       Past Medical History:   Diagnosis Date    Spinal stenosis        Past Surgical History:   Procedure Laterality Date    HX COLONOSCOPY      HX HERNIA REPAIR      HX LUMBAR LAMINECTOMY      HX TONSILLECTOMY      AS A CHILD    HX WISDOM TEETH EXTRACTION      [place] KNEE ARTHROSCOPY WITH PARTIAL MEDIAL OR LATERAL MENISCECTOMY Right 1984         Family History:   Problem Relation Age of Onset    Hypertension Mother     Cancer Father         BLADDER     Anesth Problems Neg Hx        Social History     Socioeconomic History    Marital status:      Spouse name: Not on file    Number of children: Not on file    Years of education: Not on file    Highest education level: Not on file   Occupational History    Not on file   Tobacco Use    Smoking status: Never    Smokeless tobacco: Never   Substance and Sexual Activity    Alcohol use: Yes     Alcohol/week: 1.0 standard drink     Types: 1 Glasses of wine per week     Comment: 1 DRINK DAILY     Drug use: Never    Sexual activity: Not on file   Other Topics Concern    Not on file   Social History Narrative    Not on file     Social Determinants of Health     Financial Resource Strain: Not on file   Food Insecurity: Not on file   Transportation Needs: Not on file   Physical Activity: Not on file   Stress: Not on file   Social Connections: Not on file   Intimate Partner Violence: Not on file   Housing Stability: Not on file         ALLERGIES: Toradol [ketorolac]    Review of Systems   Constitutional: Negative. Negative for activity change, chills, fatigue and unexpected weight change. HENT:  Negative for trouble swallowing. Respiratory:  Negative for cough, chest tightness, shortness of breath and wheezing. Cardiovascular: Negative. Negative for chest pain and palpitations. Gastrointestinal: Negative. Negative for abdominal pain, diarrhea, nausea and vomiting. Genitourinary: Negative. Negative for dysuria, flank pain, frequency and hematuria. Musculoskeletal:  Positive for arthralgias and joint swelling. Negative for back pain, neck pain and neck stiffness. Skin: Negative. Negative for color change and rash. Neurological: Negative. Negative for dizziness, numbness and headaches. All other systems reviewed and are negative. Vitals:    08/07/22 1552   BP: 139/82   Pulse: 85   Resp: 16   Temp: 97 °F (36.1 °C)   SpO2: 97%   Weight: 80 kg (176 lb 5.9 oz)   Height: 5' 8.5\" (1.74 m)            Physical Exam  Vitals and nursing note reviewed. Constitutional:       General: He is not in acute distress. Appearance: Normal appearance. He is well-developed. He is not toxic-appearing or diaphoretic. HENT:      Head: Normocephalic and atraumatic. Eyes:      General:         Right eye: No discharge. Left eye: No discharge. Conjunctiva/sclera: Conjunctivae normal.   Neck:      Trachea: No tracheal tenderness. Cardiovascular:      Rate and Rhythm: Normal rate and regular rhythm. Pulses: Normal pulses. Heart sounds: Normal heart sounds. Pulmonary:      Effort: Pulmonary effort is normal. No respiratory distress. Abdominal:      General: There is no distension. Musculoskeletal:      Cervical back: Full passive range of motion without pain and normal range of motion. Comments: Right wrist, linear contusion to palmar wrist.  Reproducible pain with flexion and extension of the wrist.  Able to pronate and supinate. Strength 5/5 in the hand. Cap refill brisk.   NVI throughout. No open wound. Neg snuffbox TTP. Skin:     General: Skin is warm and dry. Capillary Refill: Capillary refill takes less than 2 seconds. Findings: No abrasion, erythema or rash. Neurological:      General: No focal deficit present. Mental Status: He is alert and oriented to person, place, and time. Cranial Nerves: No cranial nerve deficit. Sensory: No sensory deficit. Coordination: Coordination normal.   Psychiatric:         Speech: Speech normal.         Behavior: Behavior normal.        MDM  Number of Diagnoses or Management Options  Right wrist sprain, initial encounter  Diagnosis management comments:   Ddx: frx, sprain, contusion       Amount and/or Complexity of Data Reviewed  Tests in the radiology section of CPT®: ordered and reviewed  Review and summarize past medical records: yes    Patient Progress  Patient progress: stable         Procedures      DISCHARGE NOTE:  5:04 PM  The patient has been re-evaluated and feeling much better and are stable for discharge. All available radiology and laboratory results have been reviewed with patient and/or available family. Patient and/or family verbally conveyed their understanding and agreement of the patient's signs, symptoms, diagnosis, treatment and prognosis and additionally agree to follow-up as recommended in the discharge instructions or to return to the Emergency Department should their condition change or worsen prior to their follow-up appointment. All questions have been answered and patient and/or available family express understanding. IMAGING RESULTS:  XR WRIST RT AP/LAT/OBL MIN 3V    Result Date: 8/7/2022  No acute abnormality. IMPRESSION:  1. Right wrist sprain, initial encounter        PLAN:  Follow-up Information       Follow up With Specialties Details Why Contact Info    Paige Sweeney MD Orthopedic Surgery  ortho hand specialist for follow-up later this week.  [address] [facility]  [phone]            Current Discharge Medication List

## 2022-08-07 NOTE — ED TRIAGE NOTES
Pt reports he was playing golf in the past couple of hours and he fell forward and landed on both of his wrists.  Pt reports the pain is worse in the R wrist.

## 2022-08-21 ENCOUNTER — HOSPITAL ENCOUNTER (EMERGENCY)
Age: 62
Discharge: HOME | End: 2022-08-21
Attending: EMERGENCY MEDICINE
Payer: COMMERCIAL

## 2022-08-21 VITALS
TEMPERATURE: 98.4 F | OXYGEN SATURATION: 97 % | WEIGHT: 176 LBS | RESPIRATION RATE: 16 BRPM | HEART RATE: 68 BPM | DIASTOLIC BLOOD PRESSURE: 83 MMHG | BODY MASS INDEX: 26.37 KG/M2 | SYSTOLIC BLOOD PRESSURE: 123 MMHG

## 2022-08-21 DIAGNOSIS — R21 RASH: Primary | ICD-10-CM

## 2022-08-21 PROCEDURE — 99283 EMERGENCY DEPT VISIT LOW MDM: CPT

## 2022-08-21 PROCEDURE — 74011636637 HC RX REV CODE- 636/637: Performed by: EMERGENCY MEDICINE

## 2022-08-21 RX ORDER — PREDNISONE 50 MG/1
50 TABLET ORAL DAILY
Qty: 7 TABLET | Refills: 0 | Status: SHIPPED | OUTPATIENT
Start: 2022-08-21 | End: 2022-08-28

## 2022-08-21 RX ORDER — LANOLIN ALCOHOL/MO/W.PET/CERES
1000 CREAM (GRAM) TOPICAL DAILY
COMMUNITY

## 2022-08-21 RX ORDER — OMALIZUMAB 150 MG/ML
INJECTION, SOLUTION SUBCUTANEOUS ONCE
COMMUNITY

## 2022-08-21 RX ADMIN — PREDNISONE 50 MG: 5 TABLET ORAL at 09:07

## 2022-08-21 NOTE — ED TRIAGE NOTES
Patient arrives with c/o itching. Patient states he has a hx of chronic idiopathic urticaria. Patient states he's now developed this new rash behind his right leg with pain, redness, itching for the last 1+ week. Went to dermatologist gave him doxycycline with very little relief.  Patient gets xolar injections every 3 weeks for the CIU (last injection was 8/8)

## 2022-08-21 NOTE — ED NOTES
Patient given discharge papers and instructions by this RN. Patient verbalized understanding and stated not having questions or concerns regarding his care. Patient ambulatory out of ED in no new acute distress.

## 2022-08-21 NOTE — ED PROVIDER NOTES
69-year-old male with history of chronic idiopathic urticaria, spinal stenosis presents to the emergency department with chief complaint of rash to his back and chest which is consistent with CIU and better than previous flares up and ever since starting injection therapy. He has a new rash behind his right knee which began about a week ago with some itching but no significant pain. The history is provided by the patient and medical records. Allergic Reaction   The current episode started more than 1 week ago. The problem has not changed since onset. Pertinent negatives include no nausea, no vomiting and no shortness of breath. Past Medical History:   Diagnosis Date    Spinal stenosis        Past Surgical History:   Procedure Laterality Date    HX COLONOSCOPY      HX HERNIA REPAIR      HX LUMBAR LAMINECTOMY      HX TONSILLECTOMY      AS A CHILD    HX WISDOM TEETH EXTRACTION      [place] KNEE ARTHROSCOPY WITH PARTIAL MEDIAL OR LATERAL MENISCECTOMY Right 1984         Family History:   Problem Relation Age of Onset    Hypertension Mother     Cancer Father         BLADDER     Anesth Problems Neg Hx        Social History     Socioeconomic History    Marital status:      Spouse name: Not on file    Number of children: Not on file    Years of education: Not on file    Highest education level: Not on file   Occupational History    Not on file   Tobacco Use    Smoking status: Never    Smokeless tobacco: Never   Substance and Sexual Activity    Alcohol use:  Yes     Alcohol/week: 1.0 standard drink     Types: 1 Glasses of wine per week     Comment: 1 DRINK DAILY     Drug use: Never    Sexual activity: Not on file   Other Topics Concern    Not on file   Social History Narrative    Not on file     Social Determinants of Health     Financial Resource Strain: Not on file   Food Insecurity: Not on file   Transportation Needs: Not on file   Physical Activity: Not on file   Stress: Not on file   Social Connections: Not on file   Intimate Partner Violence: Not on file   Housing Stability: Not on file         ALLERGIES: Toradol [ketorolac]    Review of Systems   Constitutional:  Negative for fatigue and fever. HENT:  Negative for sneezing and sore throat. Respiratory:  Negative for cough and shortness of breath. Cardiovascular:  Negative for chest pain and leg swelling. Gastrointestinal:  Negative for abdominal pain, diarrhea, nausea and vomiting. Genitourinary:  Negative for difficulty urinating and dysuria. Musculoskeletal:  Negative for arthralgias and myalgias. Skin:  Positive for rash. Negative for color change. Neurological:  Negative for weakness and headaches. Psychiatric/Behavioral:  Negative for agitation and behavioral problems. Vitals:    08/21/22 0835   BP: (!) 133/91   Pulse: 73   Resp: 16   Temp: 98.4 °F (36.9 °C)   SpO2: 95%   Weight: 79.8 kg (176 lb)            Physical Exam  Vitals and nursing note reviewed. Constitutional:       General: He is not in acute distress. Appearance: Normal appearance. He is well-developed. He is not ill-appearing, toxic-appearing or diaphoretic. HENT:      Head: Normocephalic and atraumatic. Nose: Nose normal.      Mouth/Throat:      Mouth: Mucous membranes are moist.      Pharynx: Oropharynx is clear. Eyes:      Extraocular Movements: Extraocular movements intact. Conjunctiva/sclera: Conjunctivae normal.      Pupils: Pupils are equal, round, and reactive to light. Cardiovascular:      Rate and Rhythm: Normal rate and regular rhythm. Pulses: Normal pulses. Heart sounds: No murmur heard. Pulmonary:      Effort: Pulmonary effort is normal. No respiratory distress. Breath sounds: Normal breath sounds. No wheezing. Chest:      Chest wall: No mass or tenderness. Abdominal:      General: There is no distension. Palpations: Abdomen is soft. Tenderness: There is no abdominal tenderness. There is no guarding or rebound. Musculoskeletal:         General: No swelling, tenderness, deformity or signs of injury. Normal range of motion. Cervical back: Normal range of motion and neck supple. No rigidity. No muscular tenderness. Right lower leg: No tenderness. No edema. Left lower leg: No tenderness. No edema. Skin:     General: Skin is warm and dry. Capillary Refill: Capillary refill takes less than 2 seconds. Findings: Rash (Fine papular rash diffusely across his chest and back. Macular rash with a purplish hue in the posterior right knee and extending down towards the ankle. There are some excoriation marks but also follows a dermatomal pattern) present. Neurological:      General: No focal deficit present. Mental Status: He is alert and oriented to person, place, and time. Psychiatric:         Mood and Affect: Mood normal.         Behavior: Behavior normal.        MDM  Number of Diagnoses or Management Options  Diagnosis management comments: 57-year-old male presents as above with rash. Rash across the chest and back is likely due to underlying CIU. Will provide steroid burst with instructions to follow-up with dermatology. The rash behind his right leg follows a dermatomal pattern is likely shingles, although, could represent contact dermatitis.              Procedures

## (undated) DEVICE — BIPOLAR IRRIGATOR INTEGRATED TUBING AND BIPOLAR CORD SET, DISPOSABLE

## (undated) DEVICE — FLOSEAL MATRIX IS INDICATED IN SURGICAL PROCEDURES (OTHER THAN IN OPHTHALMIC) AS AN ADJUNCT TO HEMOSTASIS WHEN CONTROL OF BLEEDING BY LIGATURE OR CONVENTIONALPROCEDURES IS INEFFECTIVE OR IMPRACTICAL.: Brand: FLOSEAL HEMOSTATIC MATRIX

## (undated) DEVICE — INFECTION CONTROL KIT SYS

## (undated) DEVICE — HANDLE LT SNAP ON ULT DURABLE LENS FOR TRUMPF ALC DISPOSABLE

## (undated) DEVICE — LAMINECTOMY RICHMOND-LF: Brand: MEDLINE INDUSTRIES, INC.

## (undated) DEVICE — SUTURE VCRL SZ 2-0 L18IN ABSRB UD L26MM CP-2 1/2 CIR REV J762D

## (undated) DEVICE — DRAPE SURG W41XL74IN CLR FULL SZ C ARM 3 ADH POLY STRP E

## (undated) DEVICE — SOLUTION IV 250ML 0.9% SOD CHL CLR INJ FLX BG CONT PRT CLSR

## (undated) DEVICE — SOLUTION IV 1000ML 0.9% SOD CHL

## (undated) DEVICE — TOOL 14MH30 LEGEND 14CM 3MM: Brand: MIDAS REX ™

## (undated) DEVICE — INTENDED FOR TISSUE SEPARATION, AND OTHER PROCEDURES THAT REQUIRE A SHARP SURGICAL BLADE TO PUNCTURE OR CUT.: Brand: BARD-PARKER ® CARBON RIB-BACK BLADES

## (undated) DEVICE — STERILE POLYISOPRENE POWDER-FREE SURGICAL GLOVES WITH EMOLLIENT COATING: Brand: PROTEXIS

## (undated) DEVICE — SOLUTION SURG PREP 26 CC PURPREP

## (undated) DEVICE — SUTURE VCRL SZ 0 L18IN ABSRB VLT L36MM CT-1 1/2 CIR J740D

## (undated) DEVICE — SURGIFOAM SPNG SZ 100

## (undated) DEVICE — TUBING, SUCTION, 1/4" X 10', STRAIGHT: Brand: MEDLINE

## (undated) DEVICE — APPLICATOR DURASEAL 15 CM ST DISP

## (undated) DEVICE — BONE WAX WHITE: Brand: BONE WAX WHITE

## (undated) DEVICE — STRAP,POSITIONING,KNEE/BODY,FOAM,4X60": Brand: MEDLINE

## (undated) DEVICE — Device

## (undated) DEVICE — POSITIONER HD REST FOAM CMFRT TCH